# Patient Record
Sex: FEMALE | Race: WHITE | HISPANIC OR LATINO | Employment: OTHER | URBAN - METROPOLITAN AREA
[De-identification: names, ages, dates, MRNs, and addresses within clinical notes are randomized per-mention and may not be internally consistent; named-entity substitution may affect disease eponyms.]

---

## 2022-09-16 ENCOUNTER — OFFICE VISIT (OUTPATIENT)
Dept: INTERNAL MEDICINE CLINIC | Facility: CLINIC | Age: 82
End: 2022-09-16
Payer: COMMERCIAL

## 2022-09-16 VITALS
OXYGEN SATURATION: 96 % | HEIGHT: 64 IN | DIASTOLIC BLOOD PRESSURE: 58 MMHG | WEIGHT: 150.8 LBS | TEMPERATURE: 98.3 F | SYSTOLIC BLOOD PRESSURE: 136 MMHG | HEART RATE: 71 BPM | BODY MASS INDEX: 25.74 KG/M2

## 2022-09-16 DIAGNOSIS — E03.9 ACQUIRED HYPOTHYROIDISM: ICD-10-CM

## 2022-09-16 DIAGNOSIS — I20.9 ANGINA PECTORIS (HCC): ICD-10-CM

## 2022-09-16 DIAGNOSIS — Z23 ENCOUNTER FOR IMMUNIZATION: ICD-10-CM

## 2022-09-16 DIAGNOSIS — I10 PRIMARY HYPERTENSION: Primary | ICD-10-CM

## 2022-09-16 DIAGNOSIS — H04.129 DRY EYE: ICD-10-CM

## 2022-09-16 DIAGNOSIS — M47.816 ARTHRITIS, LUMBAR SPINE: ICD-10-CM

## 2022-09-16 DIAGNOSIS — F32.9 MAJOR DEPRESSIVE DISORDER WITH CURRENT ACTIVE EPISODE, UNSPECIFIED DEPRESSION EPISODE SEVERITY, UNSPECIFIED WHETHER RECURRENT: ICD-10-CM

## 2022-09-16 DIAGNOSIS — F11.20 CONTINUOUS OPIOID DEPENDENCE (HCC): ICD-10-CM

## 2022-09-16 DIAGNOSIS — Z13.820 ENCOUNTER FOR OSTEOPOROSIS SCREENING IN ASYMPTOMATIC POSTMENOPAUSAL PATIENT: ICD-10-CM

## 2022-09-16 DIAGNOSIS — F33.9 DEPRESSION, RECURRENT (HCC): ICD-10-CM

## 2022-09-16 DIAGNOSIS — E11.9 TYPE 2 DIABETES MELLITUS WITHOUT COMPLICATION, WITHOUT LONG-TERM CURRENT USE OF INSULIN (HCC): ICD-10-CM

## 2022-09-16 DIAGNOSIS — M05.79 RHEUMATOID ARTHRITIS INVOLVING MULTIPLE SITES WITH POSITIVE RHEUMATOID FACTOR (HCC): ICD-10-CM

## 2022-09-16 DIAGNOSIS — Z78.0 ENCOUNTER FOR OSTEOPOROSIS SCREENING IN ASYMPTOMATIC POSTMENOPAUSAL PATIENT: ICD-10-CM

## 2022-09-16 LAB
DME PARACHUTE DELIVERY DATE REQUESTED: NORMAL
DME PARACHUTE ITEM DESCRIPTION: NORMAL
DME PARACHUTE ORDER STATUS: NORMAL
DME PARACHUTE SUPPLIER NAME: NORMAL
DME PARACHUTE SUPPLIER PHONE: NORMAL
SL AMB POCT HEMOGLOBIN AIC: 8.1 (ref ?–6.5)

## 2022-09-16 PROCEDURE — 1100F PTFALLS ASSESS-DOCD GE2>/YR: CPT

## 2022-09-16 PROCEDURE — 3725F SCREEN DEPRESSION PERFORMED: CPT

## 2022-09-16 PROCEDURE — 3288F FALL RISK ASSESSMENT DOCD: CPT

## 2022-09-16 PROCEDURE — 1160F RVW MEDS BY RX/DR IN RCRD: CPT

## 2022-09-16 PROCEDURE — 99204 OFFICE O/P NEW MOD 45 MIN: CPT

## 2022-09-16 PROCEDURE — 83036 HEMOGLOBIN GLYCOSYLATED A1C: CPT

## 2022-09-16 RX ORDER — PANTOPRAZOLE SODIUM 40 MG/1
40 TABLET, DELAYED RELEASE ORAL DAILY
COMMUNITY

## 2022-09-16 RX ORDER — COLCHICINE 0.6 MG/1
0.6 TABLET ORAL DAILY
COMMUNITY

## 2022-09-16 RX ORDER — SYRINGE, DISPOSABLE, 1 ML
SYRINGE, EMPTY DISPOSABLE MISCELLANEOUS
COMMUNITY
Start: 2022-07-22

## 2022-09-16 RX ORDER — PREGABALIN 50 MG/1
50 CAPSULE ORAL 2 TIMES DAILY
COMMUNITY
Start: 2022-08-13

## 2022-09-16 RX ORDER — ATENOLOL 50 MG/1
TABLET ORAL
COMMUNITY
Start: 2022-08-13

## 2022-09-16 RX ORDER — TRIAMTERENE AND HYDROCHLOROTHIAZIDE 37.5; 25 MG/1; MG/1
CAPSULE ORAL
COMMUNITY
Start: 2022-08-25

## 2022-09-16 RX ORDER — LEVOTHYROXINE SODIUM 0.03 MG/1
TABLET ORAL
COMMUNITY
Start: 2022-06-29

## 2022-09-16 RX ORDER — CERTOLIZUMAB PEGOL 200 MG/ML
INJECTION, SOLUTION SUBCUTANEOUS
COMMUNITY
Start: 2022-09-08

## 2022-09-16 RX ORDER — PREDNISONE 1 MG/1
5 TABLET ORAL DAILY
COMMUNITY
Start: 2022-07-22

## 2022-09-16 RX ORDER — CLONAZEPAM 0.5 MG/1
0.5 TABLET ORAL DAILY
COMMUNITY

## 2022-09-16 RX ORDER — DULOXETIN HYDROCHLORIDE 30 MG/1
CAPSULE, DELAYED RELEASE ORAL
COMMUNITY
Start: 2022-08-13

## 2022-09-16 RX ORDER — ERGOCALCIFEROL 1.25 MG/1
50000 CAPSULE ORAL WEEKLY
COMMUNITY
Start: 2022-08-18

## 2022-09-16 RX ORDER — LIDOCAINE 50 MG/G
1 PATCH TOPICAL DAILY
Qty: 15 PATCH | Refills: 3 | Status: SHIPPED | OUTPATIENT
Start: 2022-09-16 | End: 2022-10-07 | Stop reason: SDUPTHER

## 2022-09-16 RX ORDER — CYCLOSPORINE 0.5 MG/ML
EMULSION OPHTHALMIC
COMMUNITY
Start: 2022-06-29

## 2022-09-16 RX ORDER — VALACYCLOVIR HYDROCHLORIDE 500 MG/1
500 TABLET, FILM COATED ORAL
COMMUNITY

## 2022-09-16 RX ORDER — LEUCOVORIN CALCIUM 5 MG/1
TABLET ORAL
COMMUNITY
Start: 2022-06-17

## 2022-09-16 RX ORDER — ESCITALOPRAM OXALATE 10 MG/1
10 TABLET ORAL DAILY
COMMUNITY
Start: 2022-06-26

## 2022-09-16 RX ORDER — LINACLOTIDE 290 UG/1
1 CAPSULE, GELATIN COATED ORAL DAILY
COMMUNITY
Start: 2022-08-19

## 2022-09-16 RX ORDER — TRAMADOL HYDROCHLORIDE 50 MG/1
50-100 TABLET ORAL EVERY 6 HOURS PRN
COMMUNITY
Start: 2022-08-19

## 2022-09-16 RX ORDER — METHOTREXATE 25 MG/ML
INJECTION, SOLUTION INTRA-ARTERIAL; INTRAMUSCULAR; INTRAVENOUS
COMMUNITY
Start: 2022-06-28

## 2022-09-16 NOTE — PROGRESS NOTES
INTERNAL MEDICINE INITIAL OFFICE VISIT  [facility]    NAME: Ashleigh Liao  AGE: 78 y o  SEX: female  : 1940     DATE: 2022     Assessment and Plan:   1  Primary hypertension  Does not check BP at home, on dyazide and atenolol  Limit salt in diet  Does not have a home cuff to check blood pressures    2  Acquired hypothyroidism  Daughter has copy of labs that were recently drawn at home will bring next visit  Levothyroxine 25 mcg    3  Type 2 diabetes mellitus without complication, without long-term current use of insulin ([address] )  Checks blood sugars at home ranging   HGA1C today    4  Rheumatoid arthritis involving multiple sites with positive rheumatoid factor [facility])  Dr in [place] Dr Kieran Nina, has an appt 22  On methotrexate as well as daily steroids and Lyrica  Rheumatologist is managing Lyrica, tramadol, and all pain medication    5  Dry eye  Restasis, being worked up by rheumatology for Sjogrens    6  Arthritis, lumbar spine  Referral for PT  Lidoderm patch as needed  Cymbalta use    7  Major depressive disorder with current active episode, unspecified depression episode severity, unspecified whether recurrent  on Lexapro   Lives with family, good support system      Bring a copy of labs next visit, so we can update our records as well as order labs that may be needed        Return in about 3 weeks (around 10/7/2022)  Chief Complaint:     Chief Complaint   Patient presents with    Establish Care    Back Pain     For a while and it getting worse and patient is taking medication but it isn't working       History of Present Illness:     Patient presents to the office today to establish  She is accompanied by her daughter  Patient does not speak [place]  Daughter is helpful in assisting during the interview    Patient is been managed by a rheumatologist in [place] for her primary care needs however since she lives here now she is in need of a primary care provider  She is continuing to follow with her rheumatologist in [place] for rheumatoid arthritis  She does have significant back pain that is chronic for her she has had injections in the past which have been effective  The following portions of the patient's history were reviewed and updated as appropriate: allergies, current medications, past family history, past medical history, past social history, past surgical history and problem list      Review of Systems:     Review of Systems   Constitutional: Negative for appetite change, chills, diaphoresis, fatigue, fever and unexpected weight change  HENT: Negative for postnasal drip and sneezing  Eyes: Negative for visual disturbance  Respiratory: Negative for chest tightness and shortness of breath  Cardiovascular: Negative for chest pain, palpitations and leg swelling  Gastrointestinal: Negative for abdominal pain and blood in stool  Endocrine: Negative for cold intolerance, heat intolerance, polydipsia, polyphagia and polyuria  Genitourinary: Negative for difficulty urinating, dysuria, frequency and urgency  Musculoskeletal: Positive for arthralgias and back pain  Negative for myalgias  Skin: Negative for rash and wound  Neurological: Negative for dizziness, weakness, light-headedness and headaches  Hematological: Negative for adenopathy  Psychiatric/Behavioral: Negative for confusion, dysphoric mood and sleep disturbance  The patient is not nervous/anxious  Past Medical History:   History reviewed  No pertinent past medical history  Past Surgical History:   History reviewed  No pertinent surgical history  Social History:     Social History     Socioeconomic History    Marital status:       Spouse name: None    Number of children: None    Years of education: None    Highest education level: None   Occupational History    None   Tobacco Use    Smoking status: Never Smoker    Smokeless tobacco: Never Used   Substance and Sexual Activity    Alcohol use: None    Drug use: None    Sexual activity: None   Other Topics Concern    None   Social History Narrative    None     Social Determinants of Health     Financial Resource Strain: Not on file   Food Insecurity: Not on file   Transportation Needs: Not on file   Physical Activity: Not on file   Stress: Not on file   Social Connections: Not on file   Intimate Partner Violence: Not on file   Housing Stability: Not on file         Family History:   History reviewed  No pertinent family history       Current Medications:     Current Outpatient Medications:     amLODIPine Besylate-Celecoxib 5-200 MG TABS, Take by mouth, Disp: , Rfl:     atenolol (TENORMIN) 50 mg tablet, 1 TABLET BY MOUTH DAILY ORALLY ONCE A DAY 90 DAYS, Disp: , Rfl:     BD Syringe Slip Tip 25G X 5/8" 1 ML MISC, SELF INJECTING METHOTREXATE WEEKLY, Disp: , Rfl:     Cimzia Prefilled 2 X 200 MG/ML, , Disp: , Rfl:     clonazePAM (KlonoPIN) 0 5 mg tablet, Take 0 5 mg by mouth daily, Disp: , Rfl:     colchicine (COLCRYS) 0 6 mg tablet, Take 0 6 mg by mouth daily, Disp: , Rfl:     DULoxetine (CYMBALTA) 30 mg delayed release capsule, TAKE 1 CAPSULE BY MOUTH TWICE A DAY FOR 30 DAYS, Disp: , Rfl:     ergocalciferol (VITAMIN D2) 50,000 units, Take 50,000 Units by mouth once a week, Disp: , Rfl:     escitalopram (LEXAPRO) 10 mg tablet, Take 10 mg by mouth daily, Disp: , Rfl:     leucovorin (WELLCOVORIN) 5 mg tablet, TAKE 1 TABLET BY MOUTH EVERY DAY FOR 30 DAYS, Disp: , Rfl:     levothyroxine 25 mcg tablet, TAKE 1 TABLET BY MOUTH EVERY DAY IN THE MORNING ON EMPTY STOMACH FOR 90 DAYS, Disp: , Rfl:     lidocaine (Lidoderm) 5 %, Apply 1 patch topically daily Remove & Discard patch within 12 hours or as directed by MD, Disp: 15 patch, Rfl: 3    Linzess 290 MCG CAPS, Take 1 capsule by mouth daily, Disp: , Rfl:     metFORMIN (GLUCOPHAGE) 500 mg tablet, Take 500 mg by mouth daily, Disp: , Rfl:     methotrexate 50 MG/2ML injection, SLEF INJECTING (0 3) 7 5MG, Disp: , Rfl:     pantoprazole (PROTONIX) 40 mg tablet, Take 40 mg by mouth daily, Disp: , Rfl:     predniSONE 5 mg tablet, Take 5 mg by mouth daily, Disp: , Rfl:     pregabalin (LYRICA) 50 mg capsule, Take 50 mg by mouth 2 (two) times a day, Disp: , Rfl:     Restasis 0 05 % ophthalmic emulsion, INSTILL 1 DROP INTO BOTH EYES TWICE A DAY** INSUR BRAND NAME, Disp: , Rfl:     traMADol (ULTRAM) 50 mg tablet, Take  mg by mouth every 6 (six) hours as needed, Disp: , Rfl:     triamterene-hydrochlorothiazide (DYAZIDE) 37 5-25 mg per capsule, TAKE 1 CAPSULE BY MOUTH EVERY DAY IN THE MORNING FOR 90 DAYS, Disp: , Rfl:     valACYclovir (VALTREX) 500 mg tablet, Take 500 mg by mouth, Disp: , Rfl:      Allergies: Allergies   Allergen Reactions    Penicillins Rash     Hives/rash          Physical Exam:     /58 (BP Location: Left arm, Patient Position: Sitting, Cuff Size: Standard) Comment: bp  Pulse 71   Temp 98 3 °F (36 8 °C) (Temporal) Comment: no  Ht 5' 3 5" (1 613 m)   Wt 68 4 kg (150 lb 12 8 oz)   SpO2 96%   BMI 26 29 kg/m²     Physical Exam  Constitutional:       Appearance: She is well-developed  HENT:      Head: Normocephalic and atraumatic  Eyes:      Pupils: Pupils are equal, round, and reactive to light  Neck:      Thyroid: No thyromegaly  Cardiovascular:      Rate and Rhythm: Normal rate and regular rhythm  Heart sounds: No murmur heard  Pulmonary:      Effort: Pulmonary effort is normal       Breath sounds: Normal breath sounds  Abdominal:      General: Bowel sounds are normal       Palpations: Abdomen is soft  Musculoskeletal:      Cervical back: Normal range of motion and neck supple  Lumbar back: Decreased range of motion  Positive left straight leg raise test    Lymphadenopathy:      Cervical: No cervical adenopathy  Skin:     General: Skin is warm and dry  Neurological:      Mental Status: She is alert and oriented to person, place, and time             Data:     Daughter will provide copy of labs in 3 week follow up    Nina Roland, [address] [place] [address]

## 2022-09-19 ENCOUNTER — HOME HEALTH ADMISSION (OUTPATIENT)
Dept: HOME HEALTH SERVICES | Facility: HOME HEALTHCARE | Age: 82
End: 2022-09-19
Payer: COMMERCIAL

## 2022-09-19 ENCOUNTER — HOME CARE VISIT (OUTPATIENT)
Dept: HOME HEALTH SERVICES | Facility: HOME HEALTHCARE | Age: 82
End: 2022-09-19

## 2022-09-19 NOTE — CASE COMMUNICATION
TC to Pt dtr to sched PT SOC  Dtr requesting PT Marco Antonio for 9/21/22 due to pt non English speaking and dtr not available 9/20/22   Please change SOC date to 9/21/22 per caregiver request

## 2022-09-21 ENCOUNTER — HOME CARE VISIT (OUTPATIENT)
Dept: HOME HEALTH SERVICES | Facility: HOME HEALTHCARE | Age: 82
End: 2022-09-21
Payer: COMMERCIAL

## 2022-09-21 PROCEDURE — G0151 HHCP-SERV OF PT,EA 15 MIN: HCPCS

## 2022-09-21 PROCEDURE — 400013 VN SOC

## 2022-09-23 VITALS
RESPIRATION RATE: 17 BRPM | OXYGEN SATURATION: 95 % | TEMPERATURE: 98.1 F | HEART RATE: 88 BPM | SYSTOLIC BLOOD PRESSURE: 134 MMHG | DIASTOLIC BLOOD PRESSURE: 70 MMHG

## 2022-09-23 NOTE — CASE COMMUNICATION
[facility] admitted your patient to [address] [place] [address] service with the following disciplines:  PT       Response needed, please respond via  commnication note  re approval for OT eval and treat and med discrepency     Primary focus of home health care: severe chronic back pain and impaired mobility   Patient stated goals of care help her back to hanve no pain  Anticipated visit pattern and next visit date:  PT 1w1, 2w4 for there ex, t ransfer and gait training, pain management  with Estim trial to decrease pain and  improve functional mobility  NVD 9/26/22  See medication list - meds in home differ from AVS  Pt has  Amlodipine Besylate 5mg in home, per EPIC pt should be on Amlodipine Besylate-Celecoxib 5-200  Significant clinical findings:  Caregiver reports pt with several falls over the past 3 months since moving into new home  Pt reports constant severe pain 7-9 /10 with minimal improvement in pain management with use of medication as ordered  Pt noted with strength deficits in trunk and all extremities, req assist for transfers and amb  Pt noted with difficulty with B grasp requiring assist for ADLS and difficulty with self feeding due to inability to hold utensils  Recommend OT eval and treat for ADL dysfunction and home safety  Pt and caregiver in agreement with plan  Potential barriers to  goal achievement: chronic pain  Other pertinent information    Thank you for allowing us to participate in the care of your patient

## 2022-09-26 ENCOUNTER — HOME CARE VISIT (OUTPATIENT)
Dept: HOME HEALTH SERVICES | Facility: HOME HEALTHCARE | Age: 82
End: 2022-09-26
Payer: COMMERCIAL

## 2022-09-26 VITALS
DIASTOLIC BLOOD PRESSURE: 70 MMHG | OXYGEN SATURATION: 97 % | SYSTOLIC BLOOD PRESSURE: 132 MMHG | HEART RATE: 78 BPM | TEMPERATURE: 98.6 F

## 2022-09-26 PROCEDURE — G0151 HHCP-SERV OF PT,EA 15 MIN: HCPCS

## 2022-09-26 NOTE — CASE COMMUNICATION
upon drug review the following potential  drug interactions were noted    Major   between  colchicine and restasis  tramadol and excitalopram  methotrexate and amlodipine besylate and celebrex combo   methotrexate and pantoprazole  tramadol and duloxetine  duloxetine and escitalopram    Thankyou

## 2022-09-26 NOTE — CASE COMMUNICATION
TC to the patient for med review  No answer  Message left to call in to [phone] when able  If this has not been completed please remind the patient to call in for review at next therapy session  [employer] fine crackles

## 2022-09-27 ENCOUNTER — HOME CARE VISIT (OUTPATIENT)
Dept: HOME HEALTH SERVICES | Facility: HOME HEALTHCARE | Age: 82
End: 2022-09-27
Payer: COMMERCIAL

## 2022-09-27 NOTE — CASE COMMUNICATION
Spoke with patients daughter  Unable to schedule this week due to daughters schedule and doctor appointments  Daughter requested OT eval completed monday  Patient scheduled for monday

## 2022-09-28 ENCOUNTER — HOME CARE VISIT (OUTPATIENT)
Dept: HOME HEALTH SERVICES | Facility: HOME HEALTHCARE | Age: 82
End: 2022-09-28
Payer: COMMERCIAL

## 2022-09-28 VITALS
DIASTOLIC BLOOD PRESSURE: 70 MMHG | RESPIRATION RATE: 19 BRPM | SYSTOLIC BLOOD PRESSURE: 142 MMHG | OXYGEN SATURATION: 98 % | TEMPERATURE: 98.5 F | HEART RATE: 78 BPM

## 2022-09-28 PROCEDURE — G0180 MD CERTIFICATION HHA PATIENT: HCPCS

## 2022-09-28 PROCEDURE — G0151 HHCP-SERV OF PT,EA 15 MIN: HCPCS

## 2022-10-03 ENCOUNTER — HOME CARE VISIT (OUTPATIENT)
Dept: HOME HEALTH SERVICES | Facility: HOME HEALTHCARE | Age: 82
End: 2022-10-03
Payer: COMMERCIAL

## 2022-10-03 ENCOUNTER — TELEPHONE (OUTPATIENT)
Dept: INTERNAL MEDICINE CLINIC | Facility: CLINIC | Age: 82
End: 2022-10-03

## 2022-10-03 VITALS
SYSTOLIC BLOOD PRESSURE: 118 MMHG | TEMPERATURE: 98.5 F | OXYGEN SATURATION: 97 % | DIASTOLIC BLOOD PRESSURE: 62 MMHG | HEART RATE: 84 BPM | RESPIRATION RATE: 18 BRPM

## 2022-10-03 VITALS
RESPIRATION RATE: 17 BRPM | OXYGEN SATURATION: 96 % | TEMPERATURE: 98.7 F | HEART RATE: 88 BPM | SYSTOLIC BLOOD PRESSURE: 142 MMHG | DIASTOLIC BLOOD PRESSURE: 70 MMHG

## 2022-10-03 PROCEDURE — G0152 HHCP-SERV OF OT,EA 15 MIN: HCPCS

## 2022-10-03 PROCEDURE — G0151 HHCP-SERV OF PT,EA 15 MIN: HCPCS

## 2022-10-04 ENCOUNTER — HOME CARE VISIT (OUTPATIENT)
Dept: HOME HEALTH SERVICES | Facility: HOME HEALTHCARE | Age: 82
End: 2022-10-04
Payer: COMMERCIAL

## 2022-10-05 ENCOUNTER — HOME CARE VISIT (OUTPATIENT)
Dept: HOME HEALTH SERVICES | Facility: HOME HEALTHCARE | Age: 82
End: 2022-10-05
Payer: COMMERCIAL

## 2022-10-05 NOTE — CASE COMMUNICATION
Pt Dtr Florian King called to cancel visit as scheduled  Florian King reporting Pt started on antibiotics by Rheumatologist at MD appt on 10/3  due to cough  Caregiver requesting next PT visit w/o 10/10/22

## 2022-10-05 NOTE — CASE COMMUNICATION
OT evaluation is completed  Patient is limited by pain in lower back with all transfers  Requires contact gaurd assistance to use rollator and transport onto bed, and toilet  Minimal assistance to transfer into walk in shower  Patient requires minimal to moderate assistance for bed mobility due to pain with transitional movements  Recommended OT to address safety using the rollator in the kitchen to retrieve items and transport the m  Bed mobility and bathroom transfers  Recommended shower seat, grab bar and or small ramp at step into livingroom, and bedrail

## 2022-10-06 RX ORDER — LEVOFLOXACIN 500 MG/1
TABLET, FILM COATED ORAL
COMMUNITY
Start: 2022-10-03

## 2022-10-07 ENCOUNTER — APPOINTMENT (OUTPATIENT)
Dept: LAB | Facility: CLINIC | Age: 82
End: 2022-10-07
Payer: COMMERCIAL

## 2022-10-07 ENCOUNTER — HOME CARE VISIT (OUTPATIENT)
Dept: HOME HEALTH SERVICES | Facility: HOME HEALTHCARE | Age: 82
End: 2022-10-07
Payer: COMMERCIAL

## 2022-10-07 ENCOUNTER — OFFICE VISIT (OUTPATIENT)
Dept: INTERNAL MEDICINE CLINIC | Facility: CLINIC | Age: 82
End: 2022-10-07
Payer: COMMERCIAL

## 2022-10-07 VITALS
HEART RATE: 83 BPM | OXYGEN SATURATION: 96 % | SYSTOLIC BLOOD PRESSURE: 112 MMHG | DIASTOLIC BLOOD PRESSURE: 58 MMHG | WEIGHT: 150.8 LBS | TEMPERATURE: 98.4 F | HEIGHT: 64 IN | BODY MASS INDEX: 25.74 KG/M2

## 2022-10-07 DIAGNOSIS — Z23 ENCOUNTER FOR IMMUNIZATION: ICD-10-CM

## 2022-10-07 DIAGNOSIS — E11.9 TYPE 2 DIABETES MELLITUS WITHOUT COMPLICATION, WITHOUT LONG-TERM CURRENT USE OF INSULIN (HCC): Primary | ICD-10-CM

## 2022-10-07 DIAGNOSIS — R05.1 ACUTE COUGH: ICD-10-CM

## 2022-10-07 DIAGNOSIS — R21 RASH: ICD-10-CM

## 2022-10-07 DIAGNOSIS — E03.9 ACQUIRED HYPOTHYROIDISM: ICD-10-CM

## 2022-10-07 DIAGNOSIS — E11.9 TYPE 2 DIABETES MELLITUS WITHOUT COMPLICATION, WITHOUT LONG-TERM CURRENT USE OF INSULIN (HCC): ICD-10-CM

## 2022-10-07 DIAGNOSIS — M47.816 ARTHRITIS, LUMBAR SPINE: ICD-10-CM

## 2022-10-07 LAB
CREAT UR-MCNC: 51.5 MG/DL
DME PARACHUTE DELIVERY DATE ACTUAL: NORMAL
DME PARACHUTE DELIVERY DATE EXPECTED: NORMAL
DME PARACHUTE DELIVERY DATE REQUESTED: NORMAL
DME PARACHUTE ITEM DESCRIPTION: NORMAL
DME PARACHUTE ITEM DESCRIPTION: NORMAL
DME PARACHUTE ORDER STATUS: NORMAL
DME PARACHUTE SUPPLIER NAME: NORMAL
DME PARACHUTE SUPPLIER PHONE: NORMAL
LEFT EYE DIABETIC RETINOPATHY: NORMAL
LEFT EYE IMAGE QUALITY: NORMAL
LEFT EYE MACULAR EDEMA: NORMAL
LEFT EYE OTHER RETINOPATHY: NORMAL
MICROALBUMIN UR-MCNC: 6.5 MG/L (ref 0–20)
MICROALBUMIN/CREAT 24H UR: 13 MG/G CREATININE (ref 0–30)
RIGHT EYE DIABETIC RETINOPATHY: NORMAL
RIGHT EYE IMAGE QUALITY: NORMAL
RIGHT EYE MACULAR EDEMA: NORMAL
RIGHT EYE OTHER RETINOPATHY: NORMAL
SEVERITY (EYE EXAM): NORMAL

## 2022-10-07 PROCEDURE — 82043 UR ALBUMIN QUANTITATIVE: CPT

## 2022-10-07 PROCEDURE — 82570 ASSAY OF URINE CREATININE: CPT

## 2022-10-07 PROCEDURE — 92250 FUNDUS PHOTOGRAPHY W/I&R: CPT

## 2022-10-07 PROCEDURE — G0438 PPPS, INITIAL VISIT: HCPCS

## 2022-10-07 PROCEDURE — 99215 OFFICE O/P EST HI 40 MIN: CPT

## 2022-10-07 RX ORDER — ALBUTEROL SULFATE 90 UG/1
2 AEROSOL, METERED RESPIRATORY (INHALATION) EVERY 6 HOURS PRN
Qty: 18 G | Refills: 2 | Status: SHIPPED | OUTPATIENT
Start: 2022-10-07

## 2022-10-07 RX ORDER — LIDOCAINE 50 MG/G
1 PATCH TOPICAL DAILY
Qty: 15 PATCH | Refills: 3 | Status: SHIPPED | OUTPATIENT
Start: 2022-10-07

## 2022-10-07 RX ORDER — TRIAMCINOLONE ACETONIDE 5 MG/G
CREAM TOPICAL 3 TIMES DAILY
Qty: 30 G | Refills: 0 | Status: SHIPPED | OUTPATIENT
Start: 2022-10-07 | End: 2022-10-21

## 2022-10-07 NOTE — PROGRESS NOTES
Assessment and Plan:   Cough, congestion  Recently treated with Levaquin on 10/03 by rheumatologist due to cough  Patient continues with productive cough as well as decreased breath sounds on the right  Continue with Levaquin 500 mg  Consider CT scan of lung due to methotrexate use if no improvement on recheck on Monday  Consider prednisone if no improvement through the weekend    Diabetes type 2  Ranging 120s to 200 most likely due to infection, prednisone use  Limit sweets, limit sugar and carbohydrates in your diet  Choose healthy snacks such as fresh fruit, Berries are lower in glycemic index  Stay hydrated with plenty of water per day  Podiatry referral placed    Back pain  Recently started with home health services for PT and OT  Using [facility]  Continue  to wear brace for stability    Problem List Items Addressed This Visit        Endocrine    Acquired hypothyroidism    Type 2 diabetes mellitus, without long-term current use of insulin ([facility] ) - Primary    Relevant Medications    metFORMIN (GLUCOPHAGE) 500 mg tablet    Other Relevant Orders    IRIS Diabetic eye exam (Completed)    Microalbumin / creatinine urine ratio ([facility])    Ambulatory Referral to Podiatry       Musculoskeletal and Integument    Arthritis, lumbar spine    Relevant Medications    lidocaine (Lidoderm) 5 %      Other Visit Diagnoses     Encounter for immunization        Rash        Relevant Medications    lidocaine (Lidoderm) 5 %    triamcinolone (KENALOG) 0 5 % cream    Acute cough        Relevant Medications    albuterol (Ventolin HFA) 90 mcg/act inhaler        BMI Counseling: Body mass index is 26 29 kg/m²  The BMI is above normal  Nutrition recommendations include decreasing portion sizes, encouraging healthy choices of fruits and vegetables, limiting drinks that contain sugar, moderation in carbohydrate intake and reducing intake of cholesterol  Exercise recommendations include exercising 3-5 times per week   No pharmacotherapy was ordered  Rationale for BMI follow-up plan is due to patient being overweight or obese  Falls Plan of Care: balance, strength, and gait training instructions were provided  Recommended assistive device to help with gait and balance  Medications that increase falls were reviewed  Assessed feet and footwear  Home safety evaluation by OT recommended  Referral to podiatry was provided  Urinary Incontinence Plan of Care: counseling topics discussed: practice Kegel (pelvic floor strengthening) exercises, use restroom every 2 hours, limit alcohol, caffeine, spicy foods, and acidic foods, limiting fluid intake 3-4 hours before bed, preventing constipation and improving blood sugar control  Preventive health issues were discussed with patient, and age appropriate screening tests were ordered as noted in patient's After Visit Summary  Personalized health advice and appropriate referrals for health education or preventive services given if needed, as noted in patient's After Visit Summary  History of Present Illness:     Patient presents for a Medicare Wellness Visit    Patient is here today for her annual wellness as well as complaints of a cough and back pain  She was recently seen by her rheumatologist on 10/03 who started her on Levaquin 500 mg  She still continues with a cough however her daughter who accompanies her today states that she feels her mother is improving  She also started home health care for physical therapy due to back pain  She offers no other complaints at this time     Patient Care Team:  Michael Cespedes as PCP - General (Nurse Practitioner)     Review of Systems:     Review of Systems   Constitutional: Negative for appetite change, chills, diaphoresis, fatigue, fever and unexpected weight change  HENT: Negative for postnasal drip and sneezing  Eyes: Negative for visual disturbance  Respiratory: Positive for cough   Negative for chest tightness and shortness of breath  Cardiovascular: Negative for chest pain, palpitations and leg swelling  Gastrointestinal: Negative for abdominal pain and blood in stool  Endocrine: Negative for cold intolerance, heat intolerance, polydipsia, polyphagia and polyuria  Genitourinary: Negative for difficulty urinating, dysuria, frequency and urgency  Musculoskeletal: Negative for arthralgias and myalgias  Skin: Negative for rash and wound  Neurological: Negative for dizziness, weakness, light-headedness and headaches  Hematological: Negative for adenopathy  Psychiatric/Behavioral: Negative for confusion, dysphoric mood and sleep disturbance  The patient is not nervous/anxious  Problem List:     Patient Active Problem List   Diagnosis    Rheumatoid arthritis involving multiple sites with positive rheumatoid factor (HCC)    Arthritis, lumbar spine    Acquired hypothyroidism    Type 2 diabetes mellitus, without long-term current use of insulin ([facility] )    Primary hypertension    Dry eye    Major depressive disorder    Continuous opioid dependence ([facility] )    Depression, recurrent ([facility] )    Angina pectoris ([facility] )      Past Medical and Surgical History:     History reviewed  No pertinent past medical history  History reviewed  No pertinent surgical history  Family History:     History reviewed  No pertinent family history  Social History:     Social History     Socioeconomic History    Marital status:       Spouse name: None    Number of children: None    Years of education: None    Highest education level: None   Occupational History    None   Tobacco Use    Smoking status: Never Smoker    Smokeless tobacco: Never Used   Vaping Use    Vaping Use: None   Substance and Sexual Activity    Alcohol use: None    Drug use: Never    Sexual activity: None   Other Topics Concern    None   Social History Narrative    None     Social Determinants of Health     Financial Resource Strain: Low Risk     Difficulty of Paying Living Expenses: Not hard at all   Food Insecurity: Not on file   Transportation Needs: No Transportation Needs    Lack of Transportation (Medical): No    Lack of Transportation (Non-Medical):  No   Physical Activity: Not on file   Stress: Not on file   Social Connections: Not on file   Intimate Partner Violence: Not on file   Housing Stability: Not on file      Medications and Allergies:     Current Outpatient Medications   Medication Sig Dispense Refill    albuterol (Ventolin HFA) 90 mcg/act inhaler Inhale 2 puffs every 6 (six) hours as needed for wheezing 18 g 2    amLODIPine Besylate-Celecoxib 5-200 MG TABS Take by mouth      atenolol (TENORMIN) 50 mg tablet 1 TABLET BY MOUTH DAILY ORALLY ONCE A DAY 90 DAYS      BD Syringe Slip Tip 25G X 5/8" 1 ML MISC SELF INJECTING METHOTREXATE WEEKLY      Cimzia Prefilled 2 X 200 MG/ML       clonazePAM (KlonoPIN) 0 5 mg tablet Take 0 5 mg by mouth daily      colchicine (COLCRYS) 0 6 mg tablet Take 0 6 mg by mouth daily      DULoxetine (CYMBALTA) 30 mg delayed release capsule TAKE 1 CAPSULE BY MOUTH TWICE A DAY FOR 30 DAYS      ergocalciferol (VITAMIN D2) 50,000 units Take 50,000 Units by mouth once a week      escitalopram (LEXAPRO) 10 mg tablet Take 10 mg by mouth daily      leucovorin (WELLCOVORIN) 5 mg tablet TAKE 1 TABLET BY MOUTH EVERY DAY FOR 30 DAYS      levofloxacin (LEVAQUIN) 500 mg tablet       levothyroxine 25 mcg tablet TAKE 1 TABLET BY MOUTH EVERY DAY IN THE MORNING ON EMPTY STOMACH FOR 90 DAYS      lidocaine (Lidoderm) 5 % Apply 1 patch topically daily Remove & Discard patch within 12 hours or as directed by MD 15 patch 3    Linzess 290 MCG CAPS Take 1 capsule by mouth daily      metFORMIN (GLUCOPHAGE) 500 mg tablet Take 1 tablet (500 mg total) by mouth every 12 (twelve) hours 180 tablet 3    methotrexate 50 MG/2ML injection SLEF INJECTING (0 3) 7 5MG      pantoprazole (PROTONIX) 40 mg tablet Take 40 mg by mouth daily      predniSONE 5 mg tablet Take 5 mg by mouth daily      pregabalin (LYRICA) 50 mg capsule Take 50 mg by mouth 2 (two) times a day      Restasis 0 05 % ophthalmic emulsion INSTILL 1 DROP INTO BOTH EYES TWICE A DAY** INSUR BRAND NAME      traMADol (ULTRAM) 50 mg tablet Take  mg by mouth every 6 (six) hours as needed      triamcinolone (KENALOG) 0 5 % cream Apply topically 3 (three) times a day 30 g 0    triamterene-hydrochlorothiazide (DYAZIDE) 37 5-25 mg per capsule TAKE 1 CAPSULE BY MOUTH EVERY DAY IN THE MORNING FOR 90 DAYS      valACYclovir (VALTREX) 500 mg tablet Take 500 mg by mouth       No current facility-administered medications for this visit  Allergies   Allergen Reactions    Penicillins Rash     Hives/rash        Immunizations: There is no immunization history on file for this patient  Health Maintenance: There are no preventive care reminders to display for this patient  Topic Date Due    COVID-19 Vaccine (1) Never done    Pneumococcal Vaccine: 65+ Years (1 - PCV) Never done    Influenza Vaccine (1) Never done      Medicare Screening Tests and Risk Assessments:     Ashleigh is here for her Initial Wellness visit  Health Risk Assessment:   Patient rates overall health as fair  Patient feels that their physical health rating is slightly worse  Patient is dissatisfied with their life  Eyesight was rated as slightly worse  Hearing was rated as slightly worse  Patient feels that their emotional and mental health rating is slightly worse  Patients states they are often angry  Patient states they are always unusually tired/fatigued  Pain experienced in the last 7 days has been a lot  Patient's pain rating has been 8/10  Patient states that she has experienced no weight loss or gain in last 6 months  Depression Screening:   PHQ-9 Score: 10      Fall Risk Screening:    In the past year, patient has experienced: history of falling in past year    Number of falls: 2 or more  Injured during fall?: Yes    Feels unsteady when standing or walking?: Yes    Worried about falling?: Yes      Urinary Incontinence Screening:   Patient has leaked urine accidently in the last six months  Home Safety:  Patient has trouble with stairs inside or outside of their home  Patient has working smoke alarms and has working carbon monoxide detector  Home safety hazards include: uneven floors  Lack of  bars in the bathroom near toilet and bathtub  Follow with OT home therapy for safety measure as well as  bars in your bathroom    Nutrition:   Current diet is Regular  Medications:   Patient is currently taking over-the-counter supplements  OTC medications include: see medication list  Patient is not able to manage medications  Activities of Daily Living (ADLs)/Instrumental Activities of Daily Living (IADLs):   Walk and transfer into and out of bed and chair?: Yes  Dress and groom yourself?: No    Bathe or shower yourself?: Yes    Feed yourself? Yes  Do your laundry/housekeeping?: No  Manage your money, pay your bills and track your expenses?: No  Make your own meals?: No    Do your own shopping?: No    Previous Hospitalizations:   Any hospitalizations or ED visits within the last 12 months?: Yes    How many hospitalizations have you had in the last year?: 1-2    Advance Care Planning:   Living will: Yes    Durable POA for healthcare:  Yes    Advanced directive: Yes    Advanced directive counseling given: Yes    Five wishes given: Yes    Patient declined ACP directive: No    End of Life Decisions reviewed with patient: Yes    Provider agrees with end of life decisions: Yes      Cognitive Screening:   Provider or family/friend/caregiver concerned regarding cognition?: No    PREVENTIVE SCREENINGS      Cardiovascular Screening:    General: Risks and Benefits Discussed and Screening Current      Diabetes Screening:     General: History Diabetes, Risks and Benefits Discussed and Screening Current      Colorectal Cancer Screening:     General: Screening Not Indicated      Breast Cancer Screening:     General: Screening Not Indicated      Cervical Cancer Screening:    General: Screening Not Indicated      Abdominal Aortic Aneurysm (AAA) Screening:        General: Screening Not Indicated      Lung Cancer Screening:     General: Screening Not Indicated      Hepatitis C Screening:    General: Screening Not Indicated    Screening, Brief Intervention, and Referral to Treatment (SBIRT)    Screening  Typical number of drinks in a day: 0  Typical number of drinks in a week: 0  Interpretation: Low risk drinking behavior  AUDIT-C Screenin) How often did you have a drink containing alcohol in the past year? never  2) How many drinks did you have on a typical day when you were drinking in the past year? 0  3) How often did you have 6 or more drinks on one occasion in the past year? never    AUDIT-C Score: 0  Interpretation: Score 0-2 (female): Negative screen for alcohol misuse    Single Item Drug Screening:  How often have you used an illegal drug (including marijuana) or a prescription medication for non-medical reasons in the past year? never    Single Item Drug Screen Score: 0  Interpretation: Negative screen for possible drug use disorder    No exam data present     Physical Exam:     /58 (BP Location: Left arm, Patient Position: Sitting, Cuff Size: Standard) Comment: bp  Pulse 83   Temp 98 4 °F (36 9 °C) (Temporal) Comment: no  Ht 5' 3 5" (1 613 m)   Wt 68 4 kg (150 lb 12 8 oz)   SpO2 96%   BMI 26 29 kg/m²     Physical Exam  Constitutional:       Appearance: She is well-developed  HENT:      Head: Normocephalic and atraumatic  Eyes:      Pupils: Pupils are equal, round, and reactive to light  Neck:      Thyroid: No thyromegaly  Cardiovascular:      Rate and Rhythm: Normal rate and regular rhythm        Pulses: no weak pulses          Dorsalis pedis pulses are 2+ on the right side and 2+ on the left side  Posterior tibial pulses are 2+ on the right side and 2+ on the left side  Heart sounds: No murmur heard  Pulmonary:      Effort: Pulmonary effort is normal       Breath sounds: Examination of the right-upper field reveals decreased breath sounds and rhonchi  Examination of the right-middle field reveals decreased breath sounds and rhonchi  Examination of the right-lower field reveals decreased breath sounds and rhonchi  Decreased breath sounds and rhonchi present  Abdominal:      General: Bowel sounds are normal       Palpations: Abdomen is soft  Musculoskeletal:         General: Normal range of motion  Cervical back: Normal range of motion and neck supple  Feet:      Right foot:      Skin integrity: No ulcer, skin breakdown, erythema, warmth, callus or dry skin  Left foot:      Skin integrity: No ulcer, skin breakdown, erythema, warmth, callus or dry skin  Lymphadenopathy:      Cervical: No cervical adenopathy  Skin:     General: Skin is warm and dry  Neurological:      Mental Status: She is alert and oriented to person, place, and time  Diabetic Foot Exam    Patient's shoes and socks removed  Right Foot/Ankle   Right Foot Inspection  Skin Exam: skin normal and skin intact  No dry skin, no warmth, no callus, no erythema, no maceration, no abnormal color, no pre-ulcer, no ulcer and no callus  Toe Exam: ROM and strength within normal limits  Sensory   Vibration: intact  Monofilament testing: intact    Vascular  Capillary refills: < 3 seconds  The right DP pulse is 2+  The right PT pulse is 2+  Left Foot/Ankle  Left Foot Inspection  Skin Exam: skin normal and skin intact  No dry skin, no warmth, no erythema, no maceration, normal color, no pre-ulcer, no ulcer and no callus  Toe Exam: ROM and strength within normal limits       Sensory   Vibration: intact  Monofilament testing: intact    Vascular  Capillary refills: < 3 seconds  The left DP pulse is 2+  The left PT pulse is 2+       Assign Risk Category  No deformity present  No loss of protective sensation  No weak pulses  Risk: Jose Brown

## 2022-10-07 NOTE — PATIENT INSTRUCTIONS

## 2022-10-09 NOTE — CASE COMMUNICATION
Patients daughter cancelled OT visit due to doctors appointment  Unable to reschedule visit this week

## 2022-10-10 ENCOUNTER — HOME CARE VISIT (OUTPATIENT)
Dept: HOME HEALTH SERVICES | Facility: HOME HEALTHCARE | Age: 82
End: 2022-10-10
Payer: COMMERCIAL

## 2022-10-10 ENCOUNTER — OFFICE VISIT (OUTPATIENT)
Dept: INTERNAL MEDICINE CLINIC | Facility: CLINIC | Age: 82
End: 2022-10-10
Payer: COMMERCIAL

## 2022-10-10 VITALS
HEART RATE: 72 BPM | OXYGEN SATURATION: 95 % | RESPIRATION RATE: 18 BRPM | DIASTOLIC BLOOD PRESSURE: 55 MMHG | SYSTOLIC BLOOD PRESSURE: 104 MMHG

## 2022-10-10 VITALS
WEIGHT: 150 LBS | BODY MASS INDEX: 25.61 KG/M2 | HEIGHT: 64 IN | DIASTOLIC BLOOD PRESSURE: 60 MMHG | OXYGEN SATURATION: 97 % | SYSTOLIC BLOOD PRESSURE: 110 MMHG | HEART RATE: 67 BPM | TEMPERATURE: 97.7 F

## 2022-10-10 DIAGNOSIS — Z23 ENCOUNTER FOR IMMUNIZATION: Primary | ICD-10-CM

## 2022-10-10 DIAGNOSIS — R05.9 COUGH, UNSPECIFIED TYPE: ICD-10-CM

## 2022-10-10 PROCEDURE — G0152 HHCP-SERV OF OT,EA 15 MIN: HCPCS

## 2022-10-10 PROCEDURE — 99214 OFFICE O/P EST MOD 30 MIN: CPT

## 2022-10-10 NOTE — PROGRESS NOTES
INTERNAL MEDICINE FOLLOW-UP VISIT  [facility]    NAME: Ashleigh Liao  AGE: 78 y o  SEX: female  : 1940     DATE: 10/10/2022     Assessment and Plan:   1  Cough  Completed Levaquin as prescribed by her rheumatologist   She was seen on Friday but continued with a cough we started her on albuterol inhaler as needed  She does have a significant decrease in cough and states she is feeling much better  Shortness of breath and decreased endurance has been occurring over the last few months due to methotrexate use will obtain a CT scan to rule out pulmonary fibrosis due to methotrexate  - CT chest wo contrast; Future            No follow-ups on file  Chief Complaint:     Chief Complaint   Patient presents with   • Follow-up      History of Present Illness:     Patient is here for a follow-up due to cough, congestion  She has now completed her Levaquin dose and has stated she is feeling better  She does have increased shortness of breath any urine issues over the last 3 months that her daughter was concerned  with  She is also waiting for pain management to call her due to chronic lower back pain and difficulty with ambulation  The following portions of the patient's history were reviewed and updated as appropriate: allergies, current medications, past family history, past medical history, past social history, past surgical history and problem list      Review of Systems:     Review of Systems   Constitutional: Negative for appetite change, chills, diaphoresis, fatigue, fever and unexpected weight change  HENT: Negative for postnasal drip and sneezing  Eyes: Negative for visual disturbance  Respiratory: Positive for cough and shortness of breath  Negative for chest tightness  Cardiovascular: Negative for chest pain, palpitations and leg swelling  Gastrointestinal: Negative for abdominal pain and blood in stool     Endocrine: Negative for cold intolerance, heat intolerance, polydipsia, polyphagia and polyuria  Genitourinary: Negative for difficulty urinating, dysuria, frequency and urgency  Musculoskeletal: Negative for arthralgias and myalgias  Skin: Negative for rash and wound  Neurological: Negative for dizziness, weakness, light-headedness and headaches  Hematological: Negative for adenopathy  Psychiatric/Behavioral: Negative for confusion, dysphoric mood and sleep disturbance  The patient is not nervous/anxious  Past Medical History:   History reviewed  No pertinent past medical history       Current Medications:     Current Outpatient Medications:   •  albuterol (Ventolin HFA) 90 mcg/act inhaler, Inhale 2 puffs every 6 (six) hours as needed for wheezing, Disp: 18 g, Rfl: 2  •  amLODIPine Besylate-Celecoxib 5-200 MG TABS, Take by mouth, Disp: , Rfl:   •  atenolol (TENORMIN) 50 mg tablet, 1 TABLET BY MOUTH DAILY ORALLY ONCE A DAY 90 DAYS, Disp: , Rfl:   •  BD Syringe Slip Tip 25G X 5/8" 1 ML MISC, SELF INJECTING METHOTREXATE WEEKLY, Disp: , Rfl:   •  Cimzia Prefilled 2 X 200 MG/ML, , Disp: , Rfl:   •  clonazePAM (KlonoPIN) 0 5 mg tablet, Take 0 5 mg by mouth daily, Disp: , Rfl:   •  colchicine (COLCRYS) 0 6 mg tablet, Take 0 6 mg by mouth daily, Disp: , Rfl:   •  DULoxetine (CYMBALTA) 30 mg delayed release capsule, TAKE 1 CAPSULE BY MOUTH TWICE A DAY FOR 30 DAYS, Disp: , Rfl:   •  ergocalciferol (VITAMIN D2) 50,000 units, Take 50,000 Units by mouth once a week, Disp: , Rfl:   •  escitalopram (LEXAPRO) 10 mg tablet, Take 10 mg by mouth daily, Disp: , Rfl:   •  leucovorin (WELLCOVORIN) 5 mg tablet, TAKE 1 TABLET BY MOUTH EVERY DAY FOR 30 DAYS, Disp: , Rfl:   •  levofloxacin (LEVAQUIN) 500 mg tablet, , Disp: , Rfl:   •  levothyroxine 25 mcg tablet, TAKE 1 TABLET BY MOUTH EVERY DAY IN THE MORNING ON EMPTY STOMACH FOR 90 DAYS, Disp: , Rfl:   •  lidocaine (Lidoderm) 5 %, Apply 1 patch topically daily Remove & Discard patch within 12 hours or as directed by MD, Disp: 15 patch, Rfl: 3  •  Linzess 290 MCG CAPS, Take 1 capsule by mouth daily, Disp: , Rfl:   •  metFORMIN (GLUCOPHAGE) 500 mg tablet, Take 1 tablet (500 mg total) by mouth every 12 (twelve) hours, Disp: 180 tablet, Rfl: 3  •  methotrexate 50 MG/2ML injection, SLEF INJECTING (0 3) 7 5MG, Disp: , Rfl:   •  pantoprazole (PROTONIX) 40 mg tablet, Take 40 mg by mouth daily, Disp: , Rfl:   •  predniSONE 5 mg tablet, Take 5 mg by mouth daily, Disp: , Rfl:   •  pregabalin (LYRICA) 50 mg capsule, Take 50 mg by mouth 2 (two) times a day, Disp: , Rfl:   •  Restasis 0 05 % ophthalmic emulsion, INSTILL 1 DROP INTO BOTH EYES TWICE A DAY** INSUR BRAND NAME, Disp: , Rfl:   •  traMADol (ULTRAM) 50 mg tablet, Take  mg by mouth every 6 (six) hours as needed, Disp: , Rfl:   •  triamcinolone (KENALOG) 0 5 % cream, Apply topically 3 (three) times a day, Disp: 30 g, Rfl: 0  •  triamterene-hydrochlorothiazide (DYAZIDE) 37 5-25 mg per capsule, TAKE 1 CAPSULE BY MOUTH EVERY DAY IN THE MORNING FOR 90 DAYS, Disp: , Rfl:   •  valACYclovir (VALTREX) 500 mg tablet, Take 500 mg by mouth, Disp: , Rfl:      Allergies: Allergies   Allergen Reactions   • Penicillins Rash     Hives/rash          Physical Exam:     /60 (BP Location: Left arm, Patient Position: Sitting, Cuff Size: Standard) Comment: bp  Pulse 67   Temp 97 7 °F (36 5 °C) (Temporal) Comment: no  Ht 5' 3 5" (1 613 m)   Wt 68 kg (150 lb)   SpO2 97%   BMI 26 15 kg/m²     Physical Exam  Constitutional:       Appearance: She is well-developed  HENT:      Head: Normocephalic and atraumatic  Eyes:      Pupils: Pupils are equal, round, and reactive to light  Neck:      Thyroid: No thyromegaly  Cardiovascular:      Rate and Rhythm: Normal rate and regular rhythm  Heart sounds: No murmur heard  Pulmonary:      Effort: Pulmonary effort is normal       Breath sounds: Examination of the right-lower field reveals rhonchi   Examination of the left-lower field reveals rhonchi  Rhonchi present  Abdominal:      General: Bowel sounds are normal       Palpations: Abdomen is soft  Musculoskeletal:         General: Normal range of motion  Cervical back: Normal range of motion and neck supple  Lymphadenopathy:      Cervical: No cervical adenopathy  Skin:     General: Skin is warm and dry  Neurological:      Mental Status: She is alert and oriented to person, place, and time            [address] OF [facility]

## 2022-10-11 ENCOUNTER — TELEPHONE (OUTPATIENT)
Dept: INTERNAL MEDICINE CLINIC | Facility: CLINIC | Age: 82
End: 2022-10-11

## 2022-10-11 NOTE — TELEPHONE ENCOUNTER
Patient is moving needs records sent to [place] [facility] on 10/11/2022 to [facility] phone [phone]

## 2022-10-12 ENCOUNTER — HOME CARE VISIT (OUTPATIENT)
Dept: HOME HEALTH SERVICES | Facility: HOME HEALTHCARE | Age: 82
End: 2022-10-12
Payer: COMMERCIAL

## 2022-10-12 ENCOUNTER — TELEPHONE (OUTPATIENT)
Dept: INTERNAL MEDICINE CLINIC | Facility: CLINIC | Age: 82
End: 2022-10-12

## 2022-10-12 VITALS
DIASTOLIC BLOOD PRESSURE: 68 MMHG | TEMPERATURE: 98.3 F | OXYGEN SATURATION: 95 % | HEART RATE: 82 BPM | RESPIRATION RATE: 18 BRPM | SYSTOLIC BLOOD PRESSURE: 122 MMHG

## 2022-10-12 VITALS
RESPIRATION RATE: 16 BRPM | SYSTOLIC BLOOD PRESSURE: 119 MMHG | TEMPERATURE: 98 F | HEART RATE: 77 BPM | DIASTOLIC BLOOD PRESSURE: 65 MMHG | OXYGEN SATURATION: 95 %

## 2022-10-12 DIAGNOSIS — R21 RASH: ICD-10-CM

## 2022-10-12 PROCEDURE — G0152 HHCP-SERV OF OT,EA 15 MIN: HCPCS

## 2022-10-12 PROCEDURE — G0151 HHCP-SERV OF PT,EA 15 MIN: HCPCS

## 2022-10-12 RX ORDER — TRIAMCINOLONE ACETONIDE 5 MG/G
CREAM TOPICAL
Qty: 30 G | Refills: 0 | OUTPATIENT
Start: 2022-10-12

## 2022-10-12 NOTE — CASE COMMUNICATION
Upon drug review the following discrepancies were noted   daughter reports pt is not longer on levofloxacin but it is still listed  reports the amlodipine dose is daily but this is not specified  reports the valtrex is daily but this is not specified  reports the methotrexate injection is weekly but this is not specified  reports the cimzia is 2 shots monthly but this is not specified   reports she takes tylenol 325mg 2 tabs every 4 paolo rs as needed but this is not listed  reports she uses voltaren gel OTC 1 mimi 4 times per day as needed but this is not profiled  please update med list if in agreement in epic  any questions please call  sis

## 2022-10-12 NOTE — TELEPHONE ENCOUNTER
PA has been initiated for the patients Lidocaine 5% patches, patients key is: [id number]   Paperwork will be faxed once signed by the physician

## 2022-10-12 NOTE — CASE COMMUNICATION
Spoke with patients daughter who manages her medications  Reviewed medications as to name dose side effects and frequency indications   Medical provider notified of medication discrepancies as in seperate case communication from today  daughter denied any other current issues or concerns at this time

## 2022-10-14 ENCOUNTER — HOME CARE VISIT (OUTPATIENT)
Dept: HOME HEALTH SERVICES | Facility: HOME HEALTHCARE | Age: 82
End: 2022-10-14
Payer: COMMERCIAL

## 2022-10-14 ENCOUNTER — TELEPHONE (OUTPATIENT)
Dept: OTHER | Facility: OTHER | Age: 82
End: 2022-10-14

## 2022-10-14 VITALS
OXYGEN SATURATION: 99 % | TEMPERATURE: 98.8 F | SYSTOLIC BLOOD PRESSURE: 124 MMHG | RESPIRATION RATE: 18 BRPM | HEART RATE: 74 BPM | DIASTOLIC BLOOD PRESSURE: 80 MMHG

## 2022-10-14 PROCEDURE — G0151 HHCP-SERV OF PT,EA 15 MIN: HCPCS

## 2022-10-14 NOTE — TELEPHONE ENCOUNTER
[employer] is requesting an urgent call back from the office  They want to know if the patient is using the Lidocaine patch for shingles

## 2022-10-17 ENCOUNTER — HOME CARE VISIT (OUTPATIENT)
Dept: HOME HEALTH SERVICES | Facility: HOME HEALTHCARE | Age: 82
End: 2022-10-17
Payer: COMMERCIAL

## 2022-10-17 VITALS
HEART RATE: 65 BPM | DIASTOLIC BLOOD PRESSURE: 55 MMHG | SYSTOLIC BLOOD PRESSURE: 110 MMHG | RESPIRATION RATE: 18 BRPM | OXYGEN SATURATION: 96 %

## 2022-10-17 PROCEDURE — G0152 HHCP-SERV OF OT,EA 15 MIN: HCPCS

## 2022-10-19 ENCOUNTER — HOME CARE VISIT (OUTPATIENT)
Dept: HOME HEALTH SERVICES | Facility: HOME HEALTHCARE | Age: 82
End: 2022-10-19
Payer: COMMERCIAL

## 2022-10-19 VITALS
OXYGEN SATURATION: 98 % | RESPIRATION RATE: 18 BRPM | SYSTOLIC BLOOD PRESSURE: 122 MMHG | TEMPERATURE: 98.9 F | HEART RATE: 80 BPM | DIASTOLIC BLOOD PRESSURE: 60 MMHG

## 2022-10-19 PROCEDURE — G0151 HHCP-SERV OF PT,EA 15 MIN: HCPCS

## 2022-10-21 ENCOUNTER — HOME CARE VISIT (OUTPATIENT)
Dept: HOME HEALTH SERVICES | Facility: HOME HEALTHCARE | Age: 82
End: 2022-10-21
Payer: COMMERCIAL

## 2022-10-21 VITALS
HEART RATE: 85 BPM | OXYGEN SATURATION: 97 % | TEMPERATURE: 97.3 F | DIASTOLIC BLOOD PRESSURE: 70 MMHG | RESPIRATION RATE: 17 BRPM | SYSTOLIC BLOOD PRESSURE: 124 MMHG

## 2022-10-21 VITALS
RESPIRATION RATE: 18 BRPM | DIASTOLIC BLOOD PRESSURE: 85 MMHG | SYSTOLIC BLOOD PRESSURE: 110 MMHG | HEART RATE: 67 BPM | OXYGEN SATURATION: 98 %

## 2022-10-21 DIAGNOSIS — R21 RASH: ICD-10-CM

## 2022-10-21 PROCEDURE — 400013 VN SOC

## 2022-10-21 PROCEDURE — G0151 HHCP-SERV OF PT,EA 15 MIN: HCPCS

## 2022-10-21 PROCEDURE — G0152 HHCP-SERV OF OT,EA 15 MIN: HCPCS

## 2022-10-21 RX ORDER — TRIAMCINOLONE ACETONIDE 5 MG/G
CREAM TOPICAL
Qty: 30 G | Refills: 0 | Status: SHIPPED | OUTPATIENT
Start: 2022-10-21

## 2022-10-22 NOTE — CASE COMMUNICATION
Pt DC from home PT services  Goals met for IND transfers and amb with 4[facility]  Pt reports decrease in back pain with HEP from 7/10 to 5/10  Pt also reports some  relief with use of ESTIM  Pt and caregiver provided with written HEP and information on obtaining ESTIM unit  Pt current insurance not accepted by DME provider for ESTIM

## 2022-10-24 ENCOUNTER — TELEPHONE (OUTPATIENT)
Dept: INTERNAL MEDICINE CLINIC | Facility: CLINIC | Age: 82
End: 2022-10-24

## 2022-10-25 NOTE — PROGRESS NOTES
Assessment:  1  Chronic pain syndrome    2  Chronic bilateral low back pain with bilateral sciatica    3  Lumbar radiculopathy        Plan:  Orders Placed This Encounter   Procedures   • MRI lumbar spine without contrast     Standing Status:   Future     Standing Expiration Date:   10/26/2026     Scheduling Instructions: There is no preparation for this test  Please leave your jewelry and valuables at home, wedding rings are the exception  All patients will be required to change into a hospital gown and pants  Street clothes are not permitted in the MRI  Magnetic nail polish must be removed prior to arrival for your test  Please bring your insurance cards, a form of photo ID and a list of your medications with you  Arrive 15 minutes prior to your appointment time in order to register  Please bring any prior CT or MRI studies of this area that were not performed at a [facility]  To schedule this appointment, please contact Central Scheduling at [phone]  Prior to your appointment, please make sure you complete the MRI Screening Form when you e-Check in for your appointment  This will be available starting 7 days before your appointment in [address]  You may receive an e-mail with an activation code if you do not have a [employer] account  If you do not have access to a device, we will complete your screening at your appointment  Order Specific Question:   What is the patient's sedation requirement? Answer:   Unknown     Order Specific Question:   Release to patient through [employer]     Answer:   Immediate     Order Specific Question:   Is order priority selected as STAT?      Answer:   No     Order Specific Question:   Reason for Exam (FREE TEXT)     Answer:   lumbar radiculopathy       New Medications Ordered This Visit   Medications   • clonazePAM (KlonoPIN) 1 mg tablet       My impressions and treatment recommendations were discussed in detail with the patient, who verbalized understanding and had no further questions  Given that the patient has signs and symptoms of low back pain and bilateral lower extremity radiculopathy in what appears to be the bilateral L5 distribution and considering that she has had epidural steroid injections as well as trigger point injections in the past from another physician, I felt a reasonable to have the patient undergo a new MRI of the lumbar spine to evaluate for any pathology that may be contributing to her pain complaints  She has undergone physical therapy as well as conservative management in the past without any relief of symptoms  Once the patient undergoes the MRI of the lumbar spine, we will discuss further treatment options  Discharge instructions were provided  I personally saw and examined the patient and I agree with the above discussed plan of care  History of Present Illness:    Ashleigh Avelar is a 78 y o  female who presents to [facility] for initial evaluation of the above stated pain complaints  The patient has a past medical and chronic pain history as outlined in the assessment section  She was referred by TISH Long  The patient reports a 2 year history of low back pain and bilateral lower extremity radiculopathy in what appears to be the bilateral L5 distribution  She describes her pain as constant in nature  She reports 10/10 pain on the verbal numerical pain rating scale  She reports no typical pattern to her pain  She describes her pain as cramping, shooting, numbness, sharp, pins and needles, pressure-like, throbbing, dull/aching  She reports weakness in her upper extremities  She ambulates with the assistance of a cane and wheelchair  There are no pain worsening or relieving factors  Nerve injections, physical therapy, and home exercises provided no relief  Heat/ice treatment and TENS therapy provided moderate pain relief    The patient is currently anticoagulated  Lidocaine patch did provide pain relief in the past   Voltaren gel is currently being used  Tylenol is currently being used  Tizanidine was used in the past   Duloxetine, oral steroids, pregabalin is currently being used  Review of Systems:    Review of Systems   Constitutional: Negative for fever and unexpected weight change  HENT: Negative for trouble swallowing  Eyes: Negative for visual disturbance  Respiratory: Negative for shortness of breath and wheezing  Cardiovascular: Negative for chest pain and palpitations  Gastrointestinal: Negative for constipation, diarrhea, nausea and vomiting  Endocrine: Negative for cold intolerance, heat intolerance and polydipsia  Genitourinary: Negative for difficulty urinating and frequency  Musculoskeletal: Positive for arthralgias  Negative for gait problem, joint swelling and myalgias  Skin: Positive for rash  Neurological: Positive for numbness  Negative for dizziness, seizures, syncope, weakness and headaches  Hematological: Does not bruise/bleed easily  Psychiatric/Behavioral: Positive for decreased concentration  Negative for dysphoric mood  Anxiety  Depression   All other systems reviewed and are negative          Patient Active Problem List   Diagnosis   • Rheumatoid arthritis involving multiple sites with positive rheumatoid factor ([facility])   • Arthritis, lumbar spine   • Acquired hypothyroidism   • Type 2 diabetes mellitus, without long-term current use of insulin ([facility])   • Primary hypertension   • Dry eye   • Major depressive disorder   • Continuous opioid dependence ([facility])   • Depression, recurrent ([facility])   • Angina pectoris ([facility])   • Cough       Past Medical History:   Diagnosis Date   • Anxiety 's death   • Arthritis 42's   • Depression  death   • Diabetes mellitus [facility] ) 52's   • GERD (gastroesophageal reflux disease) 5 years   • Headache(784 0) do not remenber   • Hypertension 30's       Past Surgical History:   Procedure Laterality Date   • EPIDURAL BLOCK INJECTION  4 years ago   • TRIGGER POINT INJECTION  4 years ago       History reviewed  No pertinent family history      Social History     Occupational History   • Not on file   Tobacco Use   • Smoking status: Never Smoker   • Smokeless tobacco: Never Used   Vaping Use   • Vaping Use: Not on file   Substance and Sexual Activity   • Alcohol use: Not on file   • Drug use: Never   • Sexual activity: Not on file         Current Outpatient Medications:   •  albuterol (Ventolin HFA) 90 mcg/act inhaler, Inhale 2 puffs every 6 (six) hours as needed for wheezing, Disp: 18 g, Rfl: 2  •  amLODIPine Besylate-Celecoxib 5-200 MG TABS, Take by mouth daughter reports pt takes this med daily , Disp: , Rfl:   •  atenolol (TENORMIN) 50 mg tablet, 1 TABLET BY MOUTH DAILY ORALLY ONCE A DAY 90 DAYS, Disp: , Rfl:   •  BD Syringe Slip Tip 25G X 5/8" 1 ML MISC, SELF INJECTING METHOTREXATE WEEKLY, Disp: , Rfl:   •  Cimzia Prefilled 2 X 200 MG/ML, daughter reports pt takes 2 shots monthly , Disp: , Rfl:   •  clonazePAM (KlonoPIN) 0 5 mg tablet, Take 0 5 mg by mouth daily, Disp: , Rfl:   •  clonazePAM (KlonoPIN) 1 mg tablet, , Disp: , Rfl:   •  colchicine (COLCRYS) 0 6 mg tablet, Take 0 6 mg by mouth daily, Disp: , Rfl:   •  DULoxetine (CYMBALTA) 30 mg delayed release capsule, TAKE 1 CAPSULE BY MOUTH TWICE A DAY FOR 30 DAYS, Disp: , Rfl:   •  ergocalciferol (VITAMIN D2) 50,000 units, Take 50,000 Units by mouth once a week, Disp: , Rfl:   •  escitalopram (LEXAPRO) 10 mg tablet, Take 10 mg by mouth daily, Disp: , Rfl:   •  leucovorin (WELLCOVORIN) 5 mg tablet, TAKE 1 TABLET BY MOUTH EVERY DAY FOR 30 DAYS, Disp: , Rfl:   •  levofloxacin (LEVAQUIN) 500 mg tablet, daughter reports this med is completed , Disp: , Rfl:   •  levothyroxine 25 mcg tablet, TAKE 1 TABLET BY MOUTH EVERY DAY IN THE MORNING ON EMPTY STOMACH FOR 90 DAYS, Disp: , Rfl:   •  lidocaine (Lidoderm) 5 %, Apply 1 patch topically daily Remove & Discard patch within 12 hours or as directed by MD, Disp: 15 patch, Rfl: 3  •  Linzess 290 MCG CAPS, Take 1 capsule by mouth daily, Disp: , Rfl:   •  metFORMIN (GLUCOPHAGE) 500 mg tablet, Take 1 tablet (500 mg total) by mouth every 12 (twelve) hours, Disp: 180 tablet, Rfl: 3  •  methotrexate 50 MG/2ML injection, SLEF INJECTING (0 3) 7 5MG, Disp: , Rfl:   •  pantoprazole (PROTONIX) 40 mg tablet, Take 40 mg by mouth daily, Disp: , Rfl:   •  predniSONE 5 mg tablet, Take 5 mg by mouth daily, Disp: , Rfl:   •  pregabalin (LYRICA) 50 mg capsule, Take 50 mg by mouth 2 (two) times a day, Disp: , Rfl:   •  Restasis 0 05 % ophthalmic emulsion, INSTILL 1 DROP INTO BOTH EYES TWICE A DAY** INSUR BRAND NAME, Disp: , Rfl:   •  traMADol (ULTRAM) 50 mg tablet, Take  mg by mouth every 6 (six) hours as needed, Disp: , Rfl:   •  triamcinolone (KENALOG) 0 5 % cream, APPLY TO AFFECTED AREA 3 TIMES A DAY, Disp: 30 g, Rfl: 0  •  triamterene-hydrochlorothiazide (DYAZIDE) 37 5-25 mg per capsule, TAKE 1 CAPSULE BY MOUTH EVERY DAY IN THE MORNING FOR 90 DAYS, Disp: , Rfl:   •  valACYclovir (VALTREX) 500 mg tablet, Take 500 mg by mouth daughter reports pt takes this med daily, Disp: , Rfl:     Allergies   Allergen Reactions   • Penicillins Rash     Hives/rash         Physical Exam:    /73 (BP Location: Left arm, Patient Position: Sitting, Cuff Size: Standard)   Pulse 75   Ht 5' 3 5" (1 613 m)   Wt 67 6 kg (149 lb)   BMI 25 98 kg/m²     Constitutional: normal, well developed, well nourished, alert, in no distress and non-toxic and no overt pain behavior    Eyes: anicteric  HEENT: grossly intact  Neck: supple, symmetric, trachea midline and no masses   Pulmonary:even and unlabored  Cardiovascular:No edema or pitting edema present  Skin:Normal without rashes or lesions and well hydrated  Psychiatric:Mood and affect appropriate  Neurologic:Cranial Nerves II-XII grossly intact  Musculoskeletal:normal     Lumbar Spine Exam    Appearance:  Normal lordosis  Palpation/Tenderness:  no tenderness or spasm  Sensory:  no sensory deficits noted  Range of Motion:  Flexion:   Moderately limited  with pain  Extension:  Moderately limited  with pain  Lateral Flexion - Left:  Moderately limited  with pain  Lateral Flexion - Right:  Moderately limited  with pain  Rotation - Left:  Moderately limited  with pain  Rotation - Right:  Moderately limited  with pain   Lumbar facet loading is negative bilaterally  Motor Strength:  Left hip flexion:  5/5  Left hip extension:  5/5  Right hip flexion:  5/5  Right hip extension:  5/5  Left knee flexion:  5/5  Left knee extension:  5/5  Right knee flexion:  5/5  Right knee extension:  5/5  Left foot dorsiflexion:  5/5  Left foot plantar flexion:  5/5  Right foot dorsiflexion:  5/5  Right foot plantar flexion:  5/5  Reflexes:  Left Patellar:  2+   Right Patellar:  2+   Left Achilles:  2+   Right Achilles:  2+   Special Tests:  Left Straight Leg Test:  negative  Right Straight Leg Test:  negative  Left Arun's Maneuver:  negative  Right Arun's Maneuver:  negative    Imaging  MRI lumbar spine without contrast    (Results Pending)       Orders Placed This Encounter   Procedures   • MRI lumbar spine without contrast

## 2022-10-26 ENCOUNTER — CONSULT (OUTPATIENT)
Dept: PAIN MEDICINE | Facility: CLINIC | Age: 82
End: 2022-10-26
Payer: COMMERCIAL

## 2022-10-26 VITALS
HEART RATE: 75 BPM | SYSTOLIC BLOOD PRESSURE: 149 MMHG | WEIGHT: 149 LBS | HEIGHT: 64 IN | DIASTOLIC BLOOD PRESSURE: 73 MMHG | BODY MASS INDEX: 25.44 KG/M2

## 2022-10-26 DIAGNOSIS — G89.29 CHRONIC BILATERAL LOW BACK PAIN WITH BILATERAL SCIATICA: ICD-10-CM

## 2022-10-26 DIAGNOSIS — M54.42 CHRONIC BILATERAL LOW BACK PAIN WITH BILATERAL SCIATICA: ICD-10-CM

## 2022-10-26 DIAGNOSIS — M54.16 LUMBAR RADICULOPATHY: ICD-10-CM

## 2022-10-26 DIAGNOSIS — M54.41 CHRONIC BILATERAL LOW BACK PAIN WITH BILATERAL SCIATICA: ICD-10-CM

## 2022-10-26 DIAGNOSIS — G89.4 CHRONIC PAIN SYNDROME: Primary | ICD-10-CM

## 2022-10-26 PROCEDURE — 99204 OFFICE O/P NEW MOD 45 MIN: CPT | Performed by: ANESTHESIOLOGY

## 2022-10-26 RX ORDER — CLONAZEPAM 1 MG/1
TABLET ORAL
COMMUNITY
Start: 2022-10-17

## 2022-10-26 NOTE — PATIENT INSTRUCTIONS
Magnetic Resonance Imaging   WHAT YOU NEED TO KNOW:   What do I need to know about magnetic resonance imaging (MRI)? An MRI is a test that uses magnetic fields and radio waves to take pictures inside your body  An MRI is used to see blood vessels, tissue, muscles, and bones  It can also show organs, such as your heart, lungs, or liver  An MRI can help your healthcare provider diagnose or treat a medical condition  It does not use radiation  How do I prepare for an MRI? Your healthcare provider will tell you which medicines to take or not take on the day of your MRI  He or she may give you medicine to help you feel calm and relaxed during the MRI  Tell your provider if you know or think you are pregnant  Tell your provider if you have any metal in your body, such as a pacemaker, implant, or aneurism clip  Tell him or her if you have a tattoo or wear a medicine patch  Remove any metal items, such as hair clips, jewelry, glasses, hearing aids, or dentures before you enter the MRI room  What will happen during an MRI? Your healthcare provider will ask you to lie on a table  Contrast liquid may be used to help a body part show up more clearly  The table will be moved into an open space in the middle of the machine  You will need to lie still during the MRI  It is normal to hear knocking, thumping, or clicking noises from the machine  What are the risks of an MRI? An MRI may cause a metal object in your body to move out of place and cause serious injury, or stop working properly  The contrast liquid may cause nausea, a headache, lightheadedness, or pain at the injection site  You could have an allergic reaction to the contrast liquid  CARE AGREEMENT:   You have the right to help plan your care  Learn about your health condition and how it may be treated  Discuss treatment options with your healthcare providers to decide what care you want to receive  You always have the right to refuse treatment  The above information is an  only  It is not intended as medical advice for individual conditions or treatments  Talk to your doctor, nurse or pharmacist before following any medical regimen to see if it is safe and effective for you  © Copyright [employer] 2022 Information is for End User's use only and may not be sold, redistributed or otherwise used for commercial purposes   All illustrations and images included in CareNotes® are the copyrighted property of A D A M , Inc  or [address] [place] [address]

## 2022-10-28 ENCOUNTER — HOSPITAL ENCOUNTER (OUTPATIENT)
Dept: CT IMAGING | Facility: CLINIC | Age: 82
Discharge: HOME | End: 2022-10-28
Payer: COMMERCIAL

## 2022-10-28 DIAGNOSIS — R05.9 COUGH, UNSPECIFIED TYPE: ICD-10-CM

## 2022-10-28 PROCEDURE — G1004 CDSM NDSC: HCPCS

## 2022-10-28 PROCEDURE — 71250 CT THORAX DX C-: CPT

## 2022-11-04 DIAGNOSIS — R05.9 COUGH, UNSPECIFIED TYPE: Primary | ICD-10-CM

## 2022-11-07 ENCOUNTER — TELEPHONE (OUTPATIENT)
Dept: INTERNAL MEDICINE CLINIC | Facility: CLINIC | Age: 82
End: 2022-11-07

## 2022-11-07 NOTE — TELEPHONE ENCOUNTER
Wants to know reason why Jameson Alex referred her to pulmonary--is it because of an infection? Does she need to stop any meds?     Call carina

## 2022-11-14 ENCOUNTER — TELEPHONE (OUTPATIENT)
Dept: INTERNAL MEDICINE CLINIC | Facility: CLINIC | Age: 82
End: 2022-11-14

## 2022-11-16 ENCOUNTER — APPOINTMENT (OUTPATIENT)
Dept: LAB | Facility: CLINIC | Age: 82
End: 2022-11-16

## 2022-11-16 ENCOUNTER — OFFICE VISIT (OUTPATIENT)
Dept: INTERNAL MEDICINE CLINIC | Facility: CLINIC | Age: 82
End: 2022-11-16

## 2022-11-16 VITALS
SYSTOLIC BLOOD PRESSURE: 102 MMHG | HEIGHT: 64 IN | BODY MASS INDEX: 25.98 KG/M2 | DIASTOLIC BLOOD PRESSURE: 76 MMHG | OXYGEN SATURATION: 91 % | TEMPERATURE: 96.7 F | HEART RATE: 60 BPM

## 2022-11-16 DIAGNOSIS — F33.9 DEPRESSION, RECURRENT (HCC): ICD-10-CM

## 2022-11-16 DIAGNOSIS — R05.9 COUGH, UNSPECIFIED TYPE: ICD-10-CM

## 2022-11-16 DIAGNOSIS — R30.0 DYSURIA: ICD-10-CM

## 2022-11-16 DIAGNOSIS — M05.79 RHEUMATOID ARTHRITIS INVOLVING MULTIPLE SITES WITH POSITIVE RHEUMATOID FACTOR (HCC): ICD-10-CM

## 2022-11-16 DIAGNOSIS — F11.20 CONTINUOUS OPIOID DEPENDENCE (HCC): ICD-10-CM

## 2022-11-16 DIAGNOSIS — I20.9 ANGINA PECTORIS (HCC): ICD-10-CM

## 2022-11-16 DIAGNOSIS — F41.9 ANXIETY: ICD-10-CM

## 2022-11-16 DIAGNOSIS — R05.9 COUGH, UNSPECIFIED TYPE: Primary | ICD-10-CM

## 2022-11-16 LAB
BACTERIA UR QL AUTO: ABNORMAL /HPF
BILIRUB UR QL STRIP: NEGATIVE
CLARITY UR: CLEAR
COLOR UR: ABNORMAL
DME PARACHUTE DELIVERY DATE REQUESTED: NORMAL
DME PARACHUTE ITEM DESCRIPTION: NORMAL
DME PARACHUTE ORDER STATUS: NORMAL
DME PARACHUTE SUPPLIER NAME: NORMAL
DME PARACHUTE SUPPLIER PHONE: NORMAL
GLUCOSE UR STRIP-MCNC: NEGATIVE MG/DL
HGB UR QL STRIP.AUTO: NEGATIVE
KETONES UR STRIP-MCNC: NEGATIVE MG/DL
LEUKOCYTE ESTERASE UR QL STRIP: ABNORMAL
NITRITE UR QL STRIP: NEGATIVE
NON-SQ EPI CELLS URNS QL MICRO: ABNORMAL /HPF
PH UR STRIP.AUTO: 7 [PH]
PROT UR STRIP-MCNC: NEGATIVE MG/DL
RBC #/AREA URNS AUTO: ABNORMAL /HPF
SP GR UR STRIP.AUTO: 1.01 (ref 1–1.03)
UROBILINOGEN UR STRIP-ACNC: <2 MG/DL
WBC #/AREA URNS AUTO: ABNORMAL /HPF

## 2022-11-16 RX ORDER — BUDESONIDE AND FORMOTEROL FUMARATE DIHYDRATE 160; 4.5 UG/1; UG/1
2 AEROSOL RESPIRATORY (INHALATION) 2 TIMES DAILY
Qty: 10.2 G | Refills: 3 | Status: SHIPPED | OUTPATIENT
Start: 2022-11-16

## 2022-11-16 RX ORDER — ALPRAZOLAM 0.5 MG/1
0.5 TABLET ORAL
Qty: 2 TABLET | Refills: 0 | Status: SHIPPED | OUTPATIENT
Start: 2022-11-16

## 2022-11-16 RX ORDER — SULFAMETHOXAZOLE AND TRIMETHOPRIM 800; 160 MG/1; MG/1
1 TABLET ORAL 2 TIMES DAILY
Qty: 10 TABLET | Refills: 0 | Status: SHIPPED | OUTPATIENT
Start: 2022-11-16 | End: 2022-11-21

## 2022-11-16 NOTE — PROGRESS NOTES
INTERNAL MEDICINE FOLLOW-UP VISIT  [facility]    NAME: Ashleigh Liao  AGE: 78 y o  SEX: female  : 1940     DATE: 2022     Assessment and Plan:   1  Cough, unspecified type  Several weeks, CT scan of chest on 10/28 does show mild edema versus hypoventilation with inflammation  There was concern for pulmonary fibrosis due to methotrexate use therefore pulmonology consult was placed  - budesonide-formoterol (Symbicort) 160-4 5 mcg/act inhaler; Inhale 2 puffs 2 (two) times a day Rinse mouth after use  Dispense: 10 2 g; Refill: 3    2  Dysuria  Pelvic pressure, burning with urination, frequency and urgency  UA and culture obtained today due to symptoms patient was started on antibiotic however did let her know that when culture and urinalysis comes back we may have to change our plan  - sulfamethoxazole-trimethoprim (BACTRIM DS) 800-160 mg per tablet; Take 1 tablet by mouth 2 (two) times a day for 5 days  Dispense: 10 tablet; Refill: 0    3  Anxiety  Closed MRI scheduled  - ALPRAZolam (XANAX) 0 5 mg tablet; Take 1 tablet (0 5 mg total) by mouth daily at bedtime as needed for anxiety Take 1 pill 1 hour prior to MRI, may take 2nd pill 30 minutes prior to MRI if needed  Dispense: 2 tablet; Refill: 0            No follow-ups on file  Chief Complaint:     Chief Complaint   Patient presents with   • Cough   • Urinary Tract Infection      History of Present Illness:     Patient is here today with plan of chronic cough as well as difficulty with urination  Over the last several weeks her caregiver states that she has been increasing in coughing as well as a wheeze  She does have a Pulmonary consult coming up in the next couple months  She does take methotrexate  She also complains of urinary frequency, urgency, and burning      The following portions of the patient's history were reviewed and updated as appropriate: allergies, current medications, past family history, past medical history, past social history, past surgical history and problem list      Review of Systems:     Review of Systems   Constitutional: Negative for appetite change, chills, diaphoresis, fatigue, fever and unexpected weight change  HENT: Negative for postnasal drip and sneezing  Eyes: Negative for visual disturbance  Respiratory: Positive for cough, shortness of breath and wheezing  Negative for chest tightness  Cardiovascular: Negative for chest pain, palpitations and leg swelling  Gastrointestinal: Negative for abdominal pain and blood in stool  Endocrine: Negative for cold intolerance, heat intolerance, polydipsia, polyphagia and polyuria  Genitourinary: Positive for difficulty urinating, frequency, pelvic pain and urgency  Negative for dysuria  Musculoskeletal: Negative for arthralgias and myalgias  Skin: Negative for rash and wound  Neurological: Negative for dizziness, weakness, light-headedness and headaches  Hematological: Negative for adenopathy  Psychiatric/Behavioral: Negative for confusion, dysphoric mood and sleep disturbance  The patient is not nervous/anxious           Past Medical History:     Past Medical History:   Diagnosis Date   • Anxiety 's death   • Arthritis 42's   • Depression  death   • Diabetes mellitus ([facility] ) 52's   • GERD (gastroesophageal reflux disease) 5 years   • Headache(784 0) do not remenber   • Hypertension 30's        Current Medications:     Current Outpatient Medications:   •  albuterol (Ventolin HFA) 90 mcg/act inhaler, Inhale 2 puffs every 6 (six) hours as needed for wheezing, Disp: 18 g, Rfl: 2  •  ALPRAZolam (XANAX) 0 5 mg tablet, Take 1 tablet (0 5 mg total) by mouth daily at bedtime as needed for anxiety Take 1 pill 1 hour prior to MRI, may take 2nd pill 30 minutes prior to MRI if needed, Disp: 2 tablet, Rfl: 0  •  amLODIPine Besylate-Celecoxib 5-200 MG TABS, Take by mouth daughter reports pt takes this med daily , Disp: , Rfl:   •  atenolol (TENORMIN) 50 mg tablet, 1 TABLET BY MOUTH DAILY ORALLY ONCE A DAY 90 DAYS, Disp: , Rfl:   •  BD Syringe Slip Tip 25G X 5/8" 1 ML MISC, SELF INJECTING METHOTREXATE WEEKLY, Disp: , Rfl:   •  budesonide-formoterol (Symbicort) 160-4 5 mcg/act inhaler, Inhale 2 puffs 2 (two) times a day Rinse mouth after use , Disp: 10 2 g, Rfl: 3  •  Cimzia Prefilled 2 X 200 MG/ML, daughter reports pt takes 2 shots monthly , Disp: , Rfl:   •  clonazePAM (KlonoPIN) 0 5 mg tablet, Take 0 5 mg by mouth daily, Disp: , Rfl:   •  clonazePAM (KlonoPIN) 1 mg tablet, , Disp: , Rfl:   •  colchicine (COLCRYS) 0 6 mg tablet, Take 0 6 mg by mouth daily, Disp: , Rfl:   •  DULoxetine (CYMBALTA) 30 mg delayed release capsule, TAKE 1 CAPSULE BY MOUTH TWICE A DAY FOR 30 DAYS, Disp: , Rfl:   •  ergocalciferol (VITAMIN D2) 50,000 units, Take 50,000 Units by mouth once a week, Disp: , Rfl:   •  escitalopram (LEXAPRO) 10 mg tablet, Take 10 mg by mouth daily, Disp: , Rfl:   •  leucovorin (WELLCOVORIN) 5 mg tablet, TAKE 1 TABLET BY MOUTH EVERY DAY FOR 30 DAYS, Disp: , Rfl:   •  levofloxacin (LEVAQUIN) 500 mg tablet, daughter reports this med is completed , Disp: , Rfl:   •  levothyroxine 25 mcg tablet, TAKE 1 TABLET BY MOUTH EVERY DAY IN THE MORNING ON EMPTY STOMACH FOR 90 DAYS, Disp: , Rfl:   •  lidocaine (Lidoderm) 5 %, Apply 1 patch topically daily Remove & Discard patch within 12 hours or as directed by MD, Disp: 15 patch, Rfl: 3  •  Linzess 290 MCG CAPS, Take 1 capsule by mouth daily, Disp: , Rfl:   •  metFORMIN (GLUCOPHAGE) 500 mg tablet, Take 1 tablet (500 mg total) by mouth every 12 (twelve) hours, Disp: 180 tablet, Rfl: 3  •  methotrexate 50 MG/2ML injection, SLEF INJECTING (0 3) 7 5MG, Disp: , Rfl:   •  pantoprazole (PROTONIX) 40 mg tablet, Take 40 mg by mouth daily, Disp: , Rfl:   •  predniSONE 5 mg tablet, Take 5 mg by mouth daily, Disp: , Rfl:   •  pregabalin (LYRICA) 50 mg capsule, Take 50 mg by mouth 2 (two) times a day, Disp: , Rfl:   •  Restasis 0 05 % ophthalmic emulsion, INSTILL 1 DROP INTO BOTH EYES TWICE A DAY** INSUR BRAND NAME, Disp: , Rfl:   •  sulfamethoxazole-trimethoprim (BACTRIM DS) 800-160 mg per tablet, Take 1 tablet by mouth 2 (two) times a day for 5 days, Disp: 10 tablet, Rfl: 0  •  traMADol (ULTRAM) 50 mg tablet, Take  mg by mouth every 6 (six) hours as needed, Disp: , Rfl:   •  triamcinolone (KENALOG) 0 5 % cream, APPLY TO AFFECTED AREA 3 TIMES A DAY, Disp: 30 g, Rfl: 0  •  triamterene-hydrochlorothiazide (DYAZIDE) 37 5-25 mg per capsule, TAKE 1 CAPSULE BY MOUTH EVERY DAY IN THE MORNING FOR 90 DAYS, Disp: , Rfl:   •  valACYclovir (VALTREX) 500 mg tablet, Take 500 mg by mouth daughter reports pt takes this med daily, Disp: , Rfl:      Allergies: Allergies   Allergen Reactions   • Penicillins Rash     Hives/rash          Physical Exam:     /76 (BP Location: Left arm, Patient Position: Sitting, Cuff Size: Standard)   Pulse 60   Temp (!) 96 7 °F (35 9 °C)   Ht 5' 3 5" (1 613 m)   SpO2 91%   BMI 25 98 kg/m²     Physical Exam  Constitutional:       Appearance: She is well-developed and well-nourished  HENT:      Head: Normocephalic and atraumatic  Eyes:      Pupils: Pupils are equal, round, and reactive to light  Neck:      Thyroid: No thyromegaly  Cardiovascular:      Rate and Rhythm: Normal rate and regular rhythm  Heart sounds: No murmur heard  Pulmonary:      Effort: Pulmonary effort is normal       Breath sounds: Examination of the right-upper field reveals wheezing  Examination of the left-upper field reveals wheezing  Examination of the right-middle field reveals wheezing  Examination of the left-middle field reveals wheezing  Examination of the right-lower field reveals wheezing  Examination of the left-lower field reveals wheezing  Wheezing present  Abdominal:      General: Bowel sounds are normal       Palpations: Abdomen is soft     Musculoskeletal: General: Normal range of motion  Cervical back: Normal range of motion and neck supple  Lymphadenopathy:      Cervical: No cervical adenopathy  Skin:     General: Skin is warm and dry  Neurological:      Mental Status: She is alert and oriented to person, place, and time  Data:     Laboratory Results: I have personally reviewed the pertinent laboratory results/reports   Radiology/Other Diagnostic Testing Results: I have personally reviewed pertinent reports        TISH Wong  [facility]

## 2022-11-17 ENCOUNTER — TELEPHONE (OUTPATIENT)
Dept: INTERNAL MEDICINE CLINIC | Facility: CLINIC | Age: 82
End: 2022-11-17

## 2022-11-17 LAB — BACTERIA UR CULT: NORMAL

## 2022-11-17 NOTE — TELEPHONE ENCOUNTER
----- Message from [id number] sent at 11/17/2022 12:30 PM EST -----  The urine test shows no presence of infection  There were some white blood cells in the urine but that could mean the specimen was contaminated  She can discontinue the medication   Let me know if she continues with symptoms

## 2022-11-21 ENCOUNTER — TELEPHONE (OUTPATIENT)
Dept: INTERNAL MEDICINE CLINIC | Facility: CLINIC | Age: 82
End: 2022-11-21

## 2022-11-23 ENCOUNTER — HOSPITAL ENCOUNTER (OUTPATIENT)
Dept: BONE DENSITY | Facility: CLINIC | Age: 82
End: 2022-11-23

## 2022-11-23 ENCOUNTER — HOSPITAL ENCOUNTER (OUTPATIENT)
Dept: MRI IMAGING | Facility: CLINIC | Age: 82
Discharge: HOME | End: 2022-11-23

## 2022-11-23 DIAGNOSIS — G89.29 CHRONIC BILATERAL LOW BACK PAIN WITH BILATERAL SCIATICA: ICD-10-CM

## 2022-11-23 DIAGNOSIS — M54.42 CHRONIC BILATERAL LOW BACK PAIN WITH BILATERAL SCIATICA: ICD-10-CM

## 2022-11-23 DIAGNOSIS — M54.41 CHRONIC BILATERAL LOW BACK PAIN WITH BILATERAL SCIATICA: ICD-10-CM

## 2022-11-23 DIAGNOSIS — G89.4 CHRONIC PAIN SYNDROME: ICD-10-CM

## 2022-11-23 DIAGNOSIS — M54.16 LUMBAR RADICULOPATHY: ICD-10-CM

## 2022-11-28 ENCOUNTER — TELEPHONE (OUTPATIENT)
Dept: RADIOLOGY | Facility: CLINIC | Age: 82
End: 2022-11-28

## 2022-11-28 DIAGNOSIS — M54.41 CHRONIC BILATERAL LOW BACK PAIN WITH BILATERAL SCIATICA: ICD-10-CM

## 2022-11-28 DIAGNOSIS — M54.16 LUMBAR RADICULOPATHY: ICD-10-CM

## 2022-11-28 DIAGNOSIS — G89.4 CHRONIC PAIN SYNDROME: Primary | ICD-10-CM

## 2022-11-28 DIAGNOSIS — G89.29 CHRONIC BILATERAL LOW BACK PAIN WITH BILATERAL SCIATICA: ICD-10-CM

## 2022-11-28 DIAGNOSIS — M48.062 SPINAL STENOSIS OF LUMBAR REGION WITH NEUROGENIC CLAUDICATION: ICD-10-CM

## 2022-11-28 DIAGNOSIS — M54.42 CHRONIC BILATERAL LOW BACK PAIN WITH BILATERAL SCIATICA: ICD-10-CM

## 2022-11-28 RX ORDER — TOPIRAMATE 50 MG/1
100 TABLET, FILM COATED ORAL 2 TIMES DAILY
Qty: 120 TABLET | Refills: 0 | Status: SHIPPED | OUTPATIENT
Start: 2022-11-28

## 2022-11-28 NOTE — TELEPHONE ENCOUNTER
If she is not interested in any epidural steroid injection, we can trial her on topiramate  Please have her take topiramate 50 mg q h s  for 3 days, then b i d  for 3 days, then 50 mg in the morning and 100 mg at night for 3 days, then 100 mg b i d  thereafter  We can set up a follow-up with MG in 4 weeks to re-assess

## 2022-11-28 NOTE — TELEPHONE ENCOUNTER
S/w Genna Irving, given pt's age, daughter says seeing a surgeon is not something she would consider  Pt takes gabapentin, duloxetine and she thought mom had amitriptyline  Not on lyrica and topiramate  Daughter is asking what's next step?

## 2022-11-28 NOTE — TELEPHONE ENCOUNTER
Would she like to speak to a surgeon? As far as conservative management, has she tried gabapentin, duloxetine, Lyrica, topiramate, amitriptyline/nortriptyline?

## 2022-11-28 NOTE — TELEPHONE ENCOUNTER
S/w daughter Harleen Monte  Informed daughter of MRI result and recommendation  Daughter says pt had this injection before multiple times with no relief  Is there anything else we can for pt's pain  Pain is having extreme lower back pain  Pls advise

## 2022-11-28 NOTE — TELEPHONE ENCOUNTER
----- Message from Karmen Paul MD sent at 11/28/2022  9:58 AM EST -----  Regarding: MRI L-spine results  MRI of the lumbar spine shows multilevel degenerative disc disease with severe spinal stenosis at L4-L5, L1-L2, L2-L3 and moderate spinal stenosis at L3-L4  At this point, I think she would benefit from a L5-S1 lumbar epidural steroid injection  Please schedule if she is interested      Proc:  L5-S1 LESI  Dx:  M48 062, M54 16, M54 5, G89 4  CPT:  05552

## 2022-11-30 NOTE — TELEPHONE ENCOUNTER
S/w pt's daughter Dejah(POA) and advised of same  F/u [facility] scheduled  Verbalized understanding and appreciative of call

## 2022-12-08 ENCOUNTER — OFFICE VISIT (OUTPATIENT)
Dept: OBGYN CLINIC | Age: 82
End: 2022-12-08

## 2022-12-08 ENCOUNTER — HOSPITAL ENCOUNTER (OUTPATIENT)
Dept: BONE DENSITY | Facility: CLINIC | Age: 82
Discharge: HOME | End: 2022-12-08

## 2022-12-08 VITALS
HEIGHT: 64 IN | DIASTOLIC BLOOD PRESSURE: 66 MMHG | BODY MASS INDEX: 25.95 KG/M2 | SYSTOLIC BLOOD PRESSURE: 112 MMHG | WEIGHT: 152 LBS

## 2022-12-08 VITALS — BODY MASS INDEX: 29.2 KG/M2 | HEIGHT: 61 IN

## 2022-12-08 DIAGNOSIS — Z12.31 ENCOUNTER FOR SCREENING MAMMOGRAM FOR MALIGNANT NEOPLASM OF BREAST: ICD-10-CM

## 2022-12-08 DIAGNOSIS — N89.8 VAGINAL DRYNESS: Primary | ICD-10-CM

## 2022-12-08 DIAGNOSIS — Z78.0 ENCOUNTER FOR OSTEOPOROSIS SCREENING IN ASYMPTOMATIC POSTMENOPAUSAL PATIENT: ICD-10-CM

## 2022-12-08 DIAGNOSIS — Z13.820 ENCOUNTER FOR OSTEOPOROSIS SCREENING IN ASYMPTOMATIC POSTMENOPAUSAL PATIENT: ICD-10-CM

## 2022-12-08 PROBLEM — I25.10 CAD (CORONARY ARTERY DISEASE), NATIVE CORONARY ARTERY: Status: ACTIVE | Noted: 2017-05-01

## 2022-12-08 PROBLEM — E78.5 HYPERLIPIDEMIA: Status: ACTIVE | Noted: 2020-02-07

## 2022-12-08 NOTE — PROGRESS NOTES
Assessment/Plan:     Diagnoses and all orders for this visit:    Vaginal dryness  -recommended aquaphor OTC for vaginal dryness and barrier to moisture  Coconut oil also recommended for vaginal dryness  -requested records from previous provider  May consider estrace if previously using with good relief  Patient's concern is primarily vulvar  Encounter for screening mammogram for malignant neoplasm of breast  -     Mammo screening bilateral w 3d & cad; Future          Subjective:      Patient ID: Ashleigh Palma is a 78 y o  female here for chronic vaginal dryness and itching  Patient is a poor historian  Daughter provided history and assisted with translation  This has been a chronic problem for patient  Daughter states she was prescribed creams by her previous gynecologist in [place] which provided good relief - she does not recall the name of the creams prescribed  Patient reports itching and discomfort is mostly external  She sometimes complains of pelvic/vaginal pressure  She frequently wears pads and panty liners for urinary incontinence  Denies vaginal bleeding  She is not sexually active  Patient's daughter also requesting mammogram slip, patient has not gone in over 2 years  The following portions of the patient's history were reviewed and updated as appropriate:   She  has a past medical history of Anxiety ('s death), Arthritis (42's), Depression ( death), Diabetes mellitus ([address] ) (50's), GERD (gastroesophageal reflux disease) (5 years), Headache(784 0) (do not remenber), and Hypertension (30's)    She   Patient Active Problem List    Diagnosis Date Noted   • Cough 10/10/2022   • Rheumatoid arthritis involving multiple sites with positive rheumatoid factor ([address] ) 09/16/2022   • Arthritis, lumbar spine 09/16/2022   • Acquired hypothyroidism 09/16/2022   • Type 2 diabetes mellitus, without long-term current use of insulin ([address] ) 09/16/2022   • Primary hypertension 09/16/2022   • Dry eye 09/16/2022   • Major depressive disorder 09/16/2022   • Continuous opioid dependence ([facility] ) 09/16/2022   • Depression, recurrent ([place] [id number] ) 09/16/2022   • Angina pectoris ([facility] ) 09/16/2022   • Hyperlipidemia 02/07/2020   • CAD (coronary artery disease), native coronary artery 05/01/2017     She  has a past surgical history that includes Epidural block injection (4 years ago) and Trigger point injection (4 years ago)  Her family history includes Breast cancer (age of onset: 27) in her mother  She  reports that she has never smoked  She has never used smokeless tobacco  She reports that she does not currently use alcohol  She reports that she does not use drugs  Current Outpatient Medications   Medication Sig Dispense Refill   • albuterol (Ventolin HFA) 90 mcg/act inhaler Inhale 2 puffs every 6 (six) hours as needed for wheezing 18 g 2   • amLODIPine Besylate-Celecoxib 5-200 MG TABS Take by mouth daughter reports pt takes this med daily      • atenolol (TENORMIN) 50 mg tablet 1 TABLET BY MOUTH DAILY ORALLY ONCE A DAY 90 DAYS     • BD Syringe Slip Tip 25G X 5/8" 1 ML MISC SELF INJECTING METHOTREXATE WEEKLY     • budesonide-formoterol (Symbicort) 160-4 5 mcg/act inhaler Inhale 2 puffs 2 (two) times a day Rinse mouth after use   10 2 g 3   • Cimzia Prefilled 2 X 200 MG/ML daughter reports pt takes 2 shots monthly      • clonazePAM (KlonoPIN) 1 mg tablet      • colchicine (COLCRYS) 0 6 mg tablet Take 0 6 mg by mouth daily     • DULoxetine (CYMBALTA) 30 mg delayed release capsule TAKE 1 CAPSULE BY MOUTH TWICE A DAY FOR 30 DAYS     • ergocalciferol (VITAMIN D2) 50,000 units Take 50,000 Units by mouth once a week     • escitalopram (LEXAPRO) 10 mg tablet Take 10 mg by mouth daily     • leucovorin (WELLCOVORIN) 5 mg tablet TAKE 1 TABLET BY MOUTH EVERY DAY FOR 30 DAYS     • levofloxacin (LEVAQUIN) 500 mg tablet daughter reports this med is completed      • levothyroxine 25 mcg tablet TAKE 1 TABLET BY MOUTH EVERY DAY IN THE MORNING ON EMPTY STOMACH FOR 90 DAYS     • Linzess 290 MCG CAPS Take 1 capsule by mouth daily     • metFORMIN (GLUCOPHAGE) 500 mg tablet Take 1 tablet (500 mg total) by mouth every 12 (twelve) hours 180 tablet 3   • methotrexate 50 MG/2ML injection SLEF INJECTING (0 3) 7 5MG     • pantoprazole (PROTONIX) 40 mg tablet Take 40 mg by mouth daily     • predniSONE 5 mg tablet Take 5 mg by mouth daily     • pregabalin (LYRICA) 50 mg capsule Take 50 mg by mouth 2 (two) times a day     • Restasis 0 05 % ophthalmic emulsion INSTILL 1 DROP INTO BOTH EYES TWICE A DAY** INSUR BRAND NAME     • traMADol (ULTRAM) 50 mg tablet Take  mg by mouth every 6 (six) hours as needed     • triamcinolone (KENALOG) 0 5 % cream APPLY TO AFFECTED AREA 3 TIMES A DAY 30 g 0   • valACYclovir (VALTREX) 500 mg tablet Take 500 mg by mouth daughter reports pt takes this med daily     • ALPRAZolam (XANAX) 0 5 mg tablet Take 1 tablet (0 5 mg total) by mouth daily at bedtime as needed for anxiety Take 1 pill 1 hour prior to MRI, may take 2nd pill 30 minutes prior to MRI if needed (Patient not taking: Reported on 12/8/2022) 2 tablet 0   • clonazePAM (KlonoPIN) 0 5 mg tablet Take 0 5 mg by mouth daily (Patient not taking: Reported on 12/8/2022)     • lidocaine (Lidoderm) 5 % Apply 1 patch topically daily Remove & Discard patch within 12 hours or as directed by MD (Patient not taking: Reported on 12/8/2022) 15 patch 3   • topiramate (Topamax) 50 MG tablet Take 2 tablets (100 mg total) by mouth 2 (two) times a day (Patient not taking: Reported on 12/8/2022) 120 tablet 0   • triamterene-hydrochlorothiazide (DYAZIDE) 37 5-25 mg per capsule TAKE 1 CAPSULE BY MOUTH EVERY DAY IN THE MORNING FOR 90 DAYS (Patient not taking: Reported on 12/8/2022)       No current facility-administered medications for this visit  She is allergic to penicillins       Review of Systems   Constitutional: Negative for chills and fever  Respiratory: Negative for shortness of breath  Cardiovascular: Negative for chest pain  Gastrointestinal: Negative for abdominal pain and diarrhea  Genitourinary: Negative for dysuria, genital sores, pelvic pain, vaginal bleeding, vaginal discharge and vaginal pain         +vaginal dryness and itching   Musculoskeletal: Negative for myalgias  Skin: Negative for rash  Objective:    /66 (BP Location: Right arm, Patient Position: Sitting, Cuff Size: Standard)   Ht 5' 3 5" (1 613 m)   Wt 68 9 kg (152 lb)   BMI 26 50 kg/m²        Physical Exam  Vitals and nursing note reviewed  Constitutional:       General: She is not in acute distress  Appearance: Normal appearance  She is not ill-appearing  HENT:      Head: Normocephalic and atraumatic  Eyes:      Conjunctiva/sclera: Conjunctivae normal    Pulmonary:      Effort: Pulmonary effort is normal    Abdominal:      Palpations: Abdomen is soft  Tenderness: There is no abdominal tenderness  Genitourinary:     General: Normal vulva  Exam position: Lithotomy position  Labia:         Right: No rash, tenderness, lesion or injury  Left: No rash, tenderness, lesion or injury  Urethra: No prolapse, urethral pain, urethral swelling or urethral lesion  Vagina: No signs of injury and foreign body  No vaginal discharge, erythema, tenderness, bleeding, lesions or prolapsed vaginal walls  Cervix: No cervical motion tenderness, discharge, friability, lesion, erythema, cervical bleeding or eversion  Uterus: Not deviated, not enlarged, not fixed, not tender and no uterine prolapse  Adnexa:         Right: No mass, tenderness or fullness  Left: No mass, tenderness or fullness  Comments: Moderate vaginal atrophy present  Musculoskeletal:         General: Normal range of motion  Cervical back: Neck supple  Lymphadenopathy:      Lower Body: No right inguinal adenopathy  No left inguinal adenopathy     Skin:     General: Skin is warm and dry  Neurological:      General: No focal deficit present  Mental Status: She is alert     Psychiatric:         Mood and Affect: Mood normal          Behavior: Behavior normal

## 2022-12-08 NOTE — PATIENT INSTRUCTIONS
For vaginal dryness: You may use:     Coconut oil (organic, pure, unscented) as needed for moisture or lubrication  ( Do not use if allergic)       Replens moisture restore external comfort gel daily ( use as directed on the box)        Replens long lasting vaginal moisturizer  ( use as directed on the box)       For Vaginal Lubrication:        You may use:     Coconut oil (organic, pure, unscented) as needed for lubrication during intercourse  (Do not use if allergic)               Replens silky smooth lubricant, premium silicone based lubricant for intercourse  ( use as directed, a small amount will provide an enhanced natural feeling)     Any premium over the counter vaginal lubricant water or silicone based  Silicone based will have more staying power  For Vulvar hygiene:     No soaps or feminine wash to the vulva with the exception of Dove or Dove Sensitive Skin bar soap if necessary  Only perfume-free, dye-free laundry detergent, use a second rinse cycle  Avoid fabric softeners/dryer sheets  No lotion to the area  No Douching   Coconut oil as a lubricant (if not using condoms) or another scent-free premium lubricant  Loose fitting cotton underwear and loose fitting outer clothing   Partner to avoid the same products as well  Over the counter probiotic taken orally may help to restore vaginal gabrielle  Vaginal Atrophy   AMBULATORY CARE:   Vaginal atrophy  is a condition that causes thinning, drying, and inflammation of vaginal tissue  This condition is caused by decreased levels of estrogen (a female sex hormone)  Vaginal atrophy can increase your risk for vaginal and urinary tract infections  Vaginal atrophy can worsen over time if not treated     Common signs and symptoms include the following:   Vaginal dryness, itching, and burning    Vaginal discharge    Pain or discomfort during sex    Light bleeding after sex    Burning during urination    Frequent, sudden, strong urges to urinate    Urinary incontinence (loss of control of your bladder)    Contact your healthcare provider if:   You have a foul-smelling odor coming from your vagina  You have a thick, cheese-like discharge from your vagina  You have itching, swelling, or redness in your vagina  You have pain or burning when you urinate  Your urine smells bad  Your symptoms do not improve, or they get worse  You have questions or concerns about your condition or care  Treatment:   Over-the counter vaginal moisturizers  can help reduce dryness  Your healthcare provider may recommend that you use a vaginal moisturizer several times each week and during sex  Only use creams that are made for vaginal use  Do  not  use petroleum jelly  Lubricants can be used during sex to decrease pain and discomfort  Estrogen  may help decrease dryness  It may also lower your risk of vaginal infections if you are going through menopause  It can also help to relieve urinary symptoms  Estrogen may be prescribed in the form of a cream, tablet, or ring  These medicines can be applied or inserted into the vagina  Estrogen can also be prescribed in the form of a pill  Follow up with your doctor as directed:  Write down your questions so you remember to ask them during your visits  © [employer] 2022 Information is for End User's use only and may not be sold, redistributed or otherwise used for commercial purposes  All illustrations and images included in CareNotes® are the copyrighted property of A D A M , Inc  or Abdirizak Landin  The above information is an  only  It is not intended as medical advice for individual conditions or treatments  Talk to your doctor, nurse or pharmacist before following any medical regimen to see if it is safe and effective for you

## 2022-12-09 ENCOUNTER — TELEPHONE (OUTPATIENT)
Dept: INTERNAL MEDICINE CLINIC | Facility: CLINIC | Age: 82
End: 2022-12-09

## 2022-12-09 PROBLEM — R05.9 COUGH: Status: RESOLVED | Noted: 2022-10-10 | Resolved: 2022-12-09

## 2022-12-09 NOTE — TELEPHONE ENCOUNTER
----- Message from Leanne Arroyo, [address] sent at 12/9/2022  1:17 PM EST -----  Your bone density shows a small amount of bone loss called osteopenia  This is not osteoporosis however can eventually progress to this  Make sure you are taking Vit D 2,000 units per day as well as a calcium supplement   Also work with your therapist to do low weight bearing exercises to help strengthen bones

## 2022-12-22 ENCOUNTER — TELEPHONE (OUTPATIENT)
Dept: PAIN MEDICINE | Facility: CLINIC | Age: 82
End: 2022-12-22

## 2022-12-22 NOTE — PROGRESS NOTES
Pain Medicine Follow-Up Note    Assessment:  1  Chronic pain syndrome    2  Chronic bilateral low back pain with bilateral sciatica    3  Lumbar radiculopathy    4  Spinal stenosis of lumbar region with neurogenic claudication    5  Postherpetic neuralgia        Plan:  Orders Placed This Encounter   Procedures   • FL spine and pain procedure     Standing Status:   Future     Standing Expiration Date:   12/23/2026     Order Specific Question:   Reason for Exam:     Answer:   L4-L5 LESI     Order Specific Question:   Anticoagulant hold needed? Answer:   no   • Ambulatory referral to Physical Therapy     Standing Status:   Future     Standing Expiration Date:   12/23/2023     Referral Priority:   Routine     Referral Type:   Physical Therapy     Referral Reason:   Specialty Services Required     Requested Specialty:   Physical Therapy     Number of Visits Requested:   1     Expiration Date:   12/23/2023       New Medications Ordered This Visit   Medications   • lidocaine (Lidoderm) 5 %     Sig: Apply 2 patches topically daily Remove & Discard patch within 12 hours or as directed by MD     Dispense:  60 patch     Refill:  2       My impressions and treatment recommendations were discussed in detail with the patient who verbalized understanding and had no further questions  Patient follows up in the office in regards to the initiation of topiramate for her chronic pain secondary to canal stenosis  Patient's daughter states that the topiramate made her mother very sick therefore they discontinued it  The patient currently states that her pain has worsened and her pain score is 10 out of 10 on the verbal numeric pain scale  The patient's PCP prescribes her duloxetine 60 mg daily as well as Lyrica 50 mg twice daily and tramadol 50 mg tablets  Patient reports that these medications are not decreasing her current pain    Explained to the patient that I am unable to add any new medications and patient does not wish to change the dosing of her current medications  Patient is very reluctant to have an epidural steroid injections for she did not find them beneficial in the past   However at this time I encouraged her to have a L4-L5 lumbar epidural steroid injection due to her MRI and her symptoms correlating at this level  Patient has pain that follows an L5 distribution pattern  Patient is now agreeable to having an epidural steroid injection at this time  Patient also expressed that lidocaine patches have been beneficial to her for her post hepatic neuralgia, therefore I will refill the lidocaine patches  And patient is agreeable to to physical therapy  Complete risks and benefits including bleeding, infection, tissue reaction, nerve injury and allergic reaction were discussed  The approach was demonstrated using models and literature was provided  Verbal and written consent was obtained  Follow-up is planned in 4 weeks after injection time or sooner as warranted  Discharge instructions were provided  I personally saw and examined the patient and I agree with the above discussed plan of care  History of Present Illness:    Ashleigh Vincent is a 78 y o  female who presents to [facility] for interval re-evaluation of the above stated pain complaints  The patient has a past medical and chronic pain history as outlined in the assessment section  She was last seen on 10/26/2022  At today's visit patient states that her pain symptoms are worse with a pain score of 10 out of 10 on the verbal numeric pain scale  The patient's pain is worse in the morning, evening, and at night  The patient's pain is constant in nature  The quality of the patient's pain is described as dull-aching, sharp, throbbing, cramping, pressure-like, shooting, numbness and pins-and-needles  The patient indicates that her pain is in her bilateral low back and radiates down her lateral posterior thighs      Other than as stated above, the patient denies any interval changes in medications, medical condition, mental condition, symptoms, or allergies since the last office visit  Review of Systems:    Review of Systems   Respiratory: Positive for shortness of breath  Cardiovascular: Negative for chest pain  Gastrointestinal: Negative for constipation, diarrhea, nausea and vomiting  Musculoskeletal: Positive for arthralgias and gait problem  Negative for joint swelling and myalgias  Decreased ROM   Pain in back   Skin: Negative for rash  Neurological: Positive for dizziness  Negative for seizures and weakness  Psychiatric/Behavioral:        Memory loss   All other systems reviewed and are negative          Past Medical History:   Diagnosis Date   • Anxiety 's death   • Arthritis 42's   • Depression  death   • Diabetes mellitus ([address] ) 52's   • GERD (gastroesophageal reflux disease) 5 years   • Headache(784 0) do not remenber   • Hypertension 29's       Past Surgical History:   Procedure Laterality Date   • EPIDURAL BLOCK INJECTION  4 years ago   • TRIGGER POINT INJECTION  4 years ago       Family History   Problem Relation Age of Onset   • Breast cancer Mother 27   • Colon cancer Neg Hx    • Ovarian cancer Neg Hx    • Uterine cancer Neg Hx    • Cervical cancer Neg Hx        Social History     Occupational History   • Not on file   Tobacco Use   • Smoking status: Never   • Smokeless tobacco: Never   Vaping Use   • Vaping Use: Never used   Substance and Sexual Activity   • Alcohol use: Not Currently   • Drug use: Never   • Sexual activity: Not Currently         Current Outpatient Medications:   •  albuterol (Ventolin HFA) 90 mcg/act inhaler, Inhale 2 puffs every 6 (six) hours as needed for wheezing, Disp: 18 g, Rfl: 2  •  amLODIPine Besylate-Celecoxib 5-200 MG TABS, Take by mouth daughter reports pt takes this med daily , Disp: , Rfl:   •  atenolol (TENORMIN) 50 mg tablet, 1 TABLET BY MOUTH DAILY ORALLY ONCE A DAY 90 DAYS, Disp: , Rfl:   •  BD Syringe Slip Tip 25G X 5/8" 1 ML MISC, SELF INJECTING METHOTREXATE WEEKLY, Disp: , Rfl:   •  budesonide-formoterol (Symbicort) 160-4 5 mcg/act inhaler, Inhale 2 puffs 2 (two) times a day Rinse mouth after use , Disp: 10 2 g, Rfl: 3  •  Cimzia Prefilled 2 X 200 MG/ML, daughter reports pt takes 2 shots monthly , Disp: , Rfl:   •  clonazePAM (KlonoPIN) 1 mg tablet, , Disp: , Rfl:   •  colchicine (COLCRYS) 0 6 mg tablet, Take 0 6 mg by mouth daily, Disp: , Rfl:   •  DULoxetine (CYMBALTA) 30 mg delayed release capsule, TAKE 1 CAPSULE BY MOUTH TWICE A DAY FOR 30 DAYS, Disp: , Rfl:   •  ergocalciferol (VITAMIN D2) 50,000 units, Take 50,000 Units by mouth once a week, Disp: , Rfl:   •  escitalopram (LEXAPRO) 10 mg tablet, Take 10 mg by mouth daily, Disp: , Rfl:   •  leucovorin (WELLCOVORIN) 5 mg tablet, TAKE 1 TABLET BY MOUTH EVERY DAY FOR 30 DAYS, Disp: , Rfl:   •  levofloxacin (LEVAQUIN) 500 mg tablet, daughter reports this med is completed , Disp: , Rfl:   •  levothyroxine 25 mcg tablet, TAKE 1 TABLET BY MOUTH EVERY DAY IN THE MORNING ON EMPTY STOMACH FOR 90 DAYS, Disp: , Rfl:   •  lidocaine (Lidoderm) 5 %, Apply 2 patches topically daily Remove & Discard patch within 12 hours or as directed by MD, Disp: 60 patch, Rfl: 2  •  Linzess 290 MCG CAPS, Take 1 capsule by mouth daily, Disp: , Rfl:   •  metFORMIN (GLUCOPHAGE) 500 mg tablet, Take 1 tablet (500 mg total) by mouth every 12 (twelve) hours, Disp: 180 tablet, Rfl: 3  •  methotrexate 50 MG/2ML injection, SLEF INJECTING (0 3) 7 5MG, Disp: , Rfl:   •  pantoprazole (PROTONIX) 40 mg tablet, Take 40 mg by mouth daily, Disp: , Rfl:   •  predniSONE 5 mg tablet, Take 5 mg by mouth daily, Disp: , Rfl:   •  pregabalin (LYRICA) 50 mg capsule, Take 50 mg by mouth 2 (two) times a day, Disp: , Rfl:   •  Restasis 0 05 % ophthalmic emulsion, INSTILL 1 DROP INTO BOTH EYES TWICE A DAY** INSUR BRAND NAME, Disp: , Rfl:   •  traMADol (ULTRAM) 50 mg tablet, Take  mg by mouth every 6 (six) hours as needed, Disp: , Rfl:   •  triamcinolone (KENALOG) 0 5 % cream, APPLY TO AFFECTED AREA 3 TIMES A DAY, Disp: 30 g, Rfl: 0  •  valACYclovir (VALTREX) 500 mg tablet, Take 500 mg by mouth daughter reports pt takes this med daily, Disp: , Rfl:   •  ALPRAZolam (XANAX) 0 5 mg tablet, Take 1 tablet (0 5 mg total) by mouth daily at bedtime as needed for anxiety Take 1 pill 1 hour prior to MRI, may take 2nd pill 30 minutes prior to MRI if needed (Patient not taking: Reported on 12/8/2022), Disp: 2 tablet, Rfl: 0  •  clonazePAM (KlonoPIN) 0 5 mg tablet, Take 0 5 mg by mouth daily (Patient not taking: Reported on 12/8/2022), Disp: , Rfl:   •  lidocaine (Lidoderm) 5 %, Apply 1 patch topically daily Remove & Discard patch within 12 hours or as directed by MD (Patient not taking: Reported on 12/8/2022), Disp: 15 patch, Rfl: 3  •  triamterene-hydrochlorothiazide (DYAZIDE) 37 5-25 mg per capsule, TAKE 1 CAPSULE BY MOUTH EVERY DAY IN THE MORNING FOR 90 DAYS (Patient not taking: Reported on 12/8/2022), Disp: , Rfl:     Allergies   Allergen Reactions   • Penicillins Rash     Hives/rash         Physical Exam:    /65 (BP Location: Left arm, Patient Position: Sitting, Cuff Size: Standard)   Pulse 79   Ht 5' (1 524 m)   Wt 68 9 kg (152 lb)   BMI 29 69 kg/m²     Constitutional:normal, well developed, well nourished, alert, in no distress and non-toxic and no overt pain behavior    Eyes:anicteric  HEENT:grossly intact  Neck:supple, symmetric, trachea midline and no masses   Pulmonary:even and unlabored  Cardiovascular:No edema or pitting edema present  Skin:Normal without rashes or lesions and well hydrated  Psychiatric:Mood and affect appropriate  Neurologic:Cranial Nerves II-XII grossly intact  Musculoskeletal:antalgic     Lumbar Spine Exam    Appearance:  Normal lordosis  Palpation/Tenderness:  left lumbar paraspinal tenderness  right lumbar paraspinal tenderness  Special Tests:  Right modified straight leg: Positive  Left modified straight leg: Positive      Imaging  FL spine and pain procedure    (Results Pending)         Orders Placed This Encounter   Procedures   • FL spine and pain procedure   • Ambulatory referral to Physical Therapy

## 2022-12-23 ENCOUNTER — OFFICE VISIT (OUTPATIENT)
Dept: PAIN MEDICINE | Facility: CLINIC | Age: 82
End: 2022-12-23

## 2022-12-23 VITALS
SYSTOLIC BLOOD PRESSURE: 134 MMHG | DIASTOLIC BLOOD PRESSURE: 65 MMHG | WEIGHT: 152 LBS | BODY MASS INDEX: 29.84 KG/M2 | HEART RATE: 79 BPM | HEIGHT: 60 IN

## 2022-12-23 DIAGNOSIS — M54.42 CHRONIC BILATERAL LOW BACK PAIN WITH BILATERAL SCIATICA: ICD-10-CM

## 2022-12-23 DIAGNOSIS — M54.16 LUMBAR RADICULOPATHY: ICD-10-CM

## 2022-12-23 DIAGNOSIS — B02.29 POSTHERPETIC NEURALGIA: ICD-10-CM

## 2022-12-23 DIAGNOSIS — G89.29 CHRONIC BILATERAL LOW BACK PAIN WITH BILATERAL SCIATICA: ICD-10-CM

## 2022-12-23 DIAGNOSIS — M48.062 SPINAL STENOSIS OF LUMBAR REGION WITH NEUROGENIC CLAUDICATION: ICD-10-CM

## 2022-12-23 DIAGNOSIS — M54.41 CHRONIC BILATERAL LOW BACK PAIN WITH BILATERAL SCIATICA: ICD-10-CM

## 2022-12-23 DIAGNOSIS — G89.4 CHRONIC PAIN SYNDROME: Primary | ICD-10-CM

## 2022-12-23 RX ORDER — LIDOCAINE 50 MG/G
2 PATCH TOPICAL DAILY
Qty: 60 PATCH | Refills: 2 | Status: SHIPPED | OUTPATIENT
Start: 2022-12-23

## 2022-12-23 NOTE — PATIENT INSTRUCTIONS
Epidural Steroid Injection, Ambulatory Care   GENERAL INFORMATION:   What do I need to know about an epidural steroid injection? An epidural steroid injection (JARED) is a procedure to inject steroid medicine into the epidural space  The epidural space is between your spinal cord and vertebrae  Steroids reduce inflammation and fluid buildup in your spine that may be causing pain  You may be given pain medicine along with the steroids  How do I prepare for an JARED? Your healthcare provider will talk to you about how to prepare for your procedure  He will tell you what medicines to take or not take on the day of your procedure  You may need to stop taking blood thinners or other medicines several days before your procedure  You may need to adjust any diabetes medicine you take on the day of your procedure  Steroid medicine can increase your blood sugar level  What will happen during an JARED? You will be given medicine to numb the procedure area  You will be awake for the procedure, but you will not feel pain  You may also be given medicine to help you relax during the procedure  Contrast liquid will be used to help your healthcare provider see the area better  Tell the healthcare provider if you have ever had an allergic reaction to contrast liquid  Your healthcare provider may place the needle into your neck area, middle of your back, or tailbone area  He may inject the medicine next to the nerves that are causing your pain  He may instead inject the medicine into a larger area of the epidural space  This helps the medicine spread to more nerves  Your healthcare provider will use a fluoroscope to help guide the needle to the right place  A fluoroscope is a type of x-ray  After the procedure, a bandage will be placed over the injection site to prevent infection  What are the risks of an JARED? You may have temporary or permanent nerve damage or paralysis   You may have bleeding or develop a serious infection, such as meningitis (swelling of the brain coverings)  An abscess may also develop  You may need surgery to fix the abscess  You may have a seizure, anxiety, or trouble sleeping  If you are a man, you may have temporary erectile dysfunction (not able to have an erection)  CARE AGREEMENT:   You have the right to help plan your care  Learn about your health condition and how it may be treated  Discuss treatment options with your caregivers to decide what care you want to receive  You always have the right to refuse treatment  The above information is an  only  It is not intended as medical advice for individual conditions or treatments  Talk to your doctor, nurse or pharmacist before following any medical regimen to see if it is safe and effective for you  © 2014 [address] is for End User's use only and may not be sold, redistributed or otherwise used for commercial purposes  All illustrations and images included in CareNotes® are the copyrighted property of A D A M , Inc  or [employer]

## 2023-01-04 ENCOUNTER — RA CDI HCC (OUTPATIENT)
Dept: OTHER | Facility: HOSPITAL | Age: 83
End: 2023-01-04

## 2023-01-04 NOTE — PROGRESS NOTES
[facility]  coding opportunities     I25 [id number]     Chart Reviewed number of suggestions sent to Provider: 1     Patients Insurance     Medicare Insurance: [address] [place] [address]

## 2023-01-06 NOTE — TELEPHONE ENCOUNTER
Caller: Celi Eldridge (daughter)    Doctor: Esteban Packer    Reason for call: request to schedule procedure    Call back#: [phone]

## 2023-01-16 ENCOUNTER — CONSULT (OUTPATIENT)
Dept: PULMONOLOGY | Facility: CLINIC | Age: 83
End: 2023-01-16

## 2023-01-16 VITALS
HEIGHT: 60 IN | DIASTOLIC BLOOD PRESSURE: 80 MMHG | BODY MASS INDEX: 29.69 KG/M2 | OXYGEN SATURATION: 95 % | HEART RATE: 76 BPM | SYSTOLIC BLOOD PRESSURE: 124 MMHG | TEMPERATURE: 97.7 F

## 2023-01-16 DIAGNOSIS — R05.9 COUGH, UNSPECIFIED TYPE: ICD-10-CM

## 2023-01-16 DIAGNOSIS — M05.79 RHEUMATOID ARTHRITIS INVOLVING MULTIPLE SITES WITH POSITIVE RHEUMATOID FACTOR (HCC): ICD-10-CM

## 2023-01-16 DIAGNOSIS — J84.9 ILD (INTERSTITIAL LUNG DISEASE) (HCC): ICD-10-CM

## 2023-01-16 DIAGNOSIS — R06.02 SOB (SHORTNESS OF BREATH): ICD-10-CM

## 2023-01-16 DIAGNOSIS — R05.3 CHRONIC COUGH: Primary | ICD-10-CM

## 2023-01-16 NOTE — PROGRESS NOTES
Assessment/Plan:     Diagnoses and all orders for this visit:    Chronic cough  -     CT chest high resolution; Future  -     Complete PFT with post Bronchodilator and Six Minute walk; Future    Cough, unspecified type  -     Ambulatory Referral to Pulmonology    Rheumatoid arthritis involving multiple sites with positive rheumatoid factor ([facility] )  -     CT chest high resolution; Future    SOB (shortness of breath)    ILD (interstitial lung disease) ([facility])  -     CT chest high resolution; Future  -     Complete PFT with post Bronchodilator and Six Minute walk; Future          Plan for follow up:  Chronic cough in this patient with history of rheumatoid arthritis on methotrexate as well as on Cimzia, need to rule out interstitial lung disease drug-induced lung disease,  We will get high-resolution CT of the chest as well as complete PFT with a 6-minute walk test and follow-up  Reviewed CT of the chest 10/28/2022 with scattered areas of mild groundglass opacification, ? Interstitial lung disease would need a high-resolution CT of the chest  Patient has been placed on bronchodilators Symbicort 2 puffs twice daily which she is currently using along with the albuterol MDI 2 puffs 4 times daily as needed using it once or twice a day states has been helping with the shortness of breath related discussed to continue with that for now  Continues on the prednisone 5 mg daily for her rheumatoid arthritis following up with rheumatology as well  Discussed after the high-resolution CT of the chest, will reassess for bronchoscopy/BAL microbiology cultures to rule out infection if CT of the chest still abnormal with interstitial lung disease likely secondary to the rheumatoid arthritis versus drug-induced secondary to methotrexate, secondary to Cimzia  Follow-up after the above testing  No follow-ups on file  All questions are answered to the patient's satisfaction and understanding  She verbalizes understanding    She is encouraged to call with any further questions or concerns  Portions of the record may have been created with voice recognition software  Occasional wrong word or "sound a like" substitutions may have occurred due to the inherent limitations of voice recognition software  Read the chart carefully and recognize, using context, where substitutions have occurred  a    Electronically Signed by Tamika Perez MD    ______________________________________________________________________    Chief Complaint:   Chief Complaint   Patient presents with   • Cough   • Shortness of Breath   • Wheezing        Patient ID: Candida is a 78 y o  y o  female has a past medical history of Anxiety ('s death), Arthritis (42's), Depression ( death), Diabetes mellitus ([address] ) (50's), GERD (gastroesophageal reflux disease) (5 years), Headache(784 0) (do not remenber), and Hypertension (29's)  1/16/2023  Patient presents today for initial visit  Patient is a very pleasant 49-year-old lady, accompanied by her daughter for this office visit  Referred for chronic cough  Patient's daughter giving most of the history, patient does understand [place], [place] speaking  She has history of rheumatoid arthritis follows up with rheumatology on methotrexate injections weekly, also on Cimzia injections, started in September 2022  Also on prednisone 5 mg daily    Complaining of cough for several months now, daughter states that mild cough was but has been present for several years but got exacerbated a few months ago, had a CAT scan done in October 2022 found to be abnormal has been referred for the cough and the abnormal CT of the chest   At baseline she is able to walk around in the house but does have shortness of breath walking from 1 room to another,  Currently on a wheelchair for this office visit          Occupational/Exposure history: retired  Pets/Enviroment: none  Travel history: none  Review of Systems   Constitutional: Positive for fatigue  HENT: Negative  Eyes: Negative  Respiratory: Positive for cough and shortness of breath  Cardiovascular: Negative  Gastrointestinal: Negative  Endocrine: Negative  Genitourinary: Negative  Musculoskeletal: Negative  Allergic/Immunologic: Negative  Neurological: Negative  Hematological: Negative  Psychiatric/Behavioral: Negative  Social history: She reports that she has never smoked  She has never used smokeless tobacco  She reports that she does not currently use alcohol  She reports that she does not use drugs  Past surgical history:   Past Surgical History:   Procedure Laterality Date   • EPIDURAL BLOCK INJECTION  4 years ago   • TRIGGER POINT INJECTION  4 years ago     Family history:   Family History   Problem Relation Age of Onset   • Breast cancer Mother 27   • Colon cancer Neg Hx    • Ovarian cancer Neg Hx    • Uterine cancer Neg Hx    • Cervical cancer Neg Hx        Immunization History   Administered Date(s) Administered   • INFLUENZA 10/21/2022     Current Outpatient Medications   Medication Sig Dispense Refill   • albuterol (Ventolin HFA) 90 mcg/act inhaler Inhale 2 puffs every 6 (six) hours as needed for wheezing 18 g 2   • ALPRAZolam (XANAX) 0 5 mg tablet Take 1 tablet (0 5 mg total) by mouth daily at bedtime as needed for anxiety Take 1 pill 1 hour prior to MRI, may take 2nd pill 30 minutes prior to MRI if needed 2 tablet 0   • amLODIPine (NORVASC) 5 mg tablet      • amLODIPine Besylate-Celecoxib 5-200 MG TABS Take by mouth daughter reports pt takes this med daily      • atenolol (TENORMIN) 50 mg tablet 1 TABLET BY MOUTH DAILY ORALLY ONCE A DAY 90 DAYS     • BD Syringe Slip Tip 25G X 5/8" 1 ML MISC SELF INJECTING METHOTREXATE WEEKLY     • budesonide-formoterol (Symbicort) 160-4 5 mcg/act inhaler Inhale 2 puffs 2 (two) times a day Rinse mouth after use   10 2 g 3   • Cimzia Prefilled 2 X 200 MG/ML daughter reports pt takes 2 shots monthly • clonazePAM (KlonoPIN) 1 mg tablet      • colchicine (COLCRYS) 0 6 mg tablet Take 0 6 mg by mouth daily     • DULoxetine (CYMBALTA) 30 mg delayed release capsule TAKE 1 CAPSULE BY MOUTH TWICE A DAY FOR 30 DAYS     • ergocalciferol (VITAMIN D2) 50,000 units Take 50,000 Units by mouth once a week     • escitalopram (LEXAPRO) 10 mg tablet Take 10 mg by mouth daily     • leucovorin (WELLCOVORIN) 5 mg tablet TAKE 1 TABLET BY MOUTH EVERY DAY FOR 30 DAYS     • levothyroxine 25 mcg tablet TAKE 1 TABLET BY MOUTH EVERY DAY IN THE MORNING ON EMPTY STOMACH FOR 90 DAYS     • lidocaine (Lidoderm) 5 % Apply 2 patches topically daily Remove & Discard patch within 12 hours or as directed by MD 60 patch 2   • Linzess 290 MCG CAPS Take 1 capsule by mouth daily     • metFORMIN (GLUCOPHAGE) 500 mg tablet Take 1 tablet (500 mg total) by mouth every 12 (twelve) hours 180 tablet 3   • methotrexate 50 MG/2ML injection SLEF INJECTING (0 3) 7 5MG     • pantoprazole (PROTONIX) 40 mg tablet Take 40 mg by mouth daily     • predniSONE 5 mg tablet Take 5 mg by mouth daily     • pregabalin (LYRICA) 50 mg capsule Take 50 mg by mouth 2 (two) times a day     • Restasis 0 05 % ophthalmic emulsion INSTILL 1 DROP INTO BOTH EYES TWICE A DAY** INSUR BRAND NAME     • traMADol (ULTRAM) 50 mg tablet Take  mg by mouth every 6 (six) hours as needed     • triamcinolone (KENALOG) 0 5 % cream APPLY TO AFFECTED AREA 3 TIMES A DAY 30 g 0   • valACYclovir (VALTREX) 500 mg tablet Take 500 mg by mouth daughter reports pt takes this med daily     • clonazePAM (KlonoPIN) 0 5 mg tablet Take 0 5 mg by mouth daily (Patient not taking: Reported on 12/8/2022)     • levofloxacin (LEVAQUIN) 500 mg tablet daughter reports this med is completed  (Patient not taking: Reported on 1/16/2023)     • lidocaine (Lidoderm) 5 % Apply 1 patch topically daily Remove & Discard patch within 12 hours or as directed by MD (Patient not taking: Reported on 12/8/2022) 15 patch 3   • triamterene-hydrochlorothiazide (DYAZIDE) 37 5-25 mg per capsule TAKE 1 CAPSULE BY MOUTH EVERY DAY IN THE MORNING FOR 90 DAYS (Patient not taking: Reported on 12/8/2022)       No current facility-administered medications for this visit  Allergies: Penicillins    Objective:  Vitals:    01/16/23 0851   BP: 124/80   Pulse: 76   Temp: 97 7 °F (36 5 °C)   SpO2: 95%   Height: 5' (1 524 m)   Oxygen Therapy  SpO2: 95 %    Wt Readings from Last 3 Encounters:   12/23/22 68 9 kg (152 lb)   12/08/22 68 9 kg (152 lb)   11/23/22 68 kg (150 lb)     Body mass index is 29 69 kg/m²  Physical Exam  Vitals and nursing note reviewed  Constitutional:       Appearance: She is well-developed  HENT:      Head: Normocephalic and atraumatic  Eyes:      Conjunctiva/sclera: Conjunctivae normal       Pupils: Pupils are equal, round, and reactive to light  Neck:      Thyroid: No thyromegaly  Vascular: No JVD  Cardiovascular:      Rate and Rhythm: Normal rate and regular rhythm  Heart sounds: Normal heart sounds  No murmur heard  No friction rub  No gallop  Pulmonary:      Effort: Pulmonary effort is normal  No respiratory distress  Breath sounds: Normal breath sounds  No wheezing or rales  Chest:      Chest wall: No tenderness  Musculoskeletal:         General: No tenderness or deformity  Normal range of motion  Cervical back: Normal range of motion and neck supple  Lymphadenopathy:      Cervical: No cervical adenopathy  Skin:     General: Skin is warm and dry  Neurological:      Mental Status: She is alert and oriented to person, place, and time             Diagnostics:  I have personally reviewed pertinent films in PACS CT of the chest 10/28/2022

## 2023-01-18 ENCOUNTER — TELEPHONE (OUTPATIENT)
Dept: INTERNAL MEDICINE CLINIC | Facility: CLINIC | Age: 83
End: 2023-01-18

## 2023-01-18 NOTE — TELEPHONE ENCOUNTER
Ext: 163 for Juni Chua with [facility]     I left her a vm     Would like the F# to Fax the last office note and any testing to Dr Prasad Thompson office for this pt with Daniel Colmenares    They left a vm with our office, but the full F# wasn't mentioned    They left this F#: [phone]

## 2023-01-18 NOTE — TELEPHONE ENCOUNTER
Got the correct F# for Dr Suzan Mckeon from [facility]  F#: [phone]    Faxed Yancy's last office note, labs and testing

## 2023-01-23 ENCOUNTER — HOSPITAL ENCOUNTER (OUTPATIENT)
Dept: CT IMAGING | Facility: CLINIC | Age: 83
Discharge: HOME | End: 2023-01-23

## 2023-01-23 DIAGNOSIS — M05.79 RHEUMATOID ARTHRITIS INVOLVING MULTIPLE SITES WITH POSITIVE RHEUMATOID FACTOR (HCC): ICD-10-CM

## 2023-01-23 DIAGNOSIS — J84.9 ILD (INTERSTITIAL LUNG DISEASE) (HCC): ICD-10-CM

## 2023-01-23 DIAGNOSIS — R05.3 CHRONIC COUGH: ICD-10-CM

## 2023-01-26 ENCOUNTER — HOSPITAL ENCOUNTER (OUTPATIENT)
Dept: PULMONOLOGY | Facility: HOSPITAL | Age: 83
End: 2023-01-26
Attending: INTERNAL MEDICINE

## 2023-01-26 DIAGNOSIS — J84.9 ILD (INTERSTITIAL LUNG DISEASE) (HCC): ICD-10-CM

## 2023-01-26 DIAGNOSIS — R05.3 CHRONIC COUGH: ICD-10-CM

## 2023-01-26 RX ORDER — ALBUTEROL SULFATE 2.5 MG/3ML
2.5 SOLUTION RESPIRATORY (INHALATION) ONCE
Status: COMPLETED | OUTPATIENT
Start: 2023-01-26 | End: 2023-01-26

## 2023-01-26 RX ADMIN — ALBUTEROL SULFATE 2.5 MG: 2.5 SOLUTION RESPIRATORY (INHALATION) at 17:45

## 2023-04-28 DIAGNOSIS — R21 RASH: ICD-10-CM

## 2023-04-28 DIAGNOSIS — M47.816 ARTHRITIS, LUMBAR SPINE: ICD-10-CM

## 2023-04-28 RX ORDER — LIDOCAINE 50 MG/G
1 PATCH TOPICAL DAILY
Qty: 15 PATCH | Refills: 0 | Status: SHIPPED | OUTPATIENT
Start: 2023-04-28

## 2023-04-28 RX ORDER — TRIAMCINOLONE ACETONIDE 5 MG/G
CREAM TOPICAL 3 TIMES DAILY
Qty: 30 G | Refills: 0 | Status: SHIPPED | OUTPATIENT
Start: 2023-04-28

## 2024-01-10 DIAGNOSIS — E11.9 TYPE 2 DIABETES MELLITUS WITHOUT COMPLICATION, WITHOUT LONG-TERM CURRENT USE OF INSULIN (HCC): ICD-10-CM
